# Patient Record
Sex: FEMALE | Race: WHITE | NOT HISPANIC OR LATINO | Employment: FULL TIME | URBAN - METROPOLITAN AREA
[De-identification: names, ages, dates, MRNs, and addresses within clinical notes are randomized per-mention and may not be internally consistent; named-entity substitution may affect disease eponyms.]

---

## 2018-02-27 PROBLEM — M16.12 PRIMARY OSTEOARTHRITIS OF LEFT HIP: Status: ACTIVE | Noted: 2018-02-27

## 2019-08-22 ENCOUNTER — TELEPHONE (OUTPATIENT)
Dept: PAIN MEDICINE | Facility: CLINIC | Age: 74
End: 2019-08-22

## 2019-08-22 NOTE — TELEPHONE ENCOUNTER
Spoke with the patient and she is aware that we did not have anything sooner than her appointment date on 9/10. She is currently on the wait list for a sooner appointment.

## 2019-08-22 NOTE — TELEPHONE ENCOUNTER
----- Message from Ryan Rosario sent at 8/22/2019  3:51 PM CDT -----  Type:  Sooner Apoointment Request    Caller is requesting a sooner appointment.  Caller declined first available appointment listed below.  Caller will not accept being placed on the waitlist and is requesting a message be sent to doctor.    Name of Caller:  Patient  When is the first available appointment?  Patient states that she is scheduled on 09/04 but would like to be seen on 08/27 if possible.  Patient is on the wait list  Symptoms:  Spinal stenosis-New Patient  Best Call Back Number:  [phone]  Additional Information:

## 2019-09-10 ENCOUNTER — OFFICE VISIT (OUTPATIENT)
Dept: PAIN MEDICINE | Facility: CLINIC | Age: 74
End: 2019-09-10
Payer: MEDICARE

## 2019-09-10 VITALS
SYSTOLIC BLOOD PRESSURE: 165 MMHG | DIASTOLIC BLOOD PRESSURE: 73 MMHG | TEMPERATURE: 97 F | WEIGHT: 179.81 LBS | RESPIRATION RATE: 18 BRPM | BODY MASS INDEX: 33.95 KG/M2 | HEIGHT: 61 IN | HEART RATE: 75 BPM | OXYGEN SATURATION: 95 %

## 2019-09-10 DIAGNOSIS — M48.062 SPINAL STENOSIS OF LUMBAR REGION WITH NEUROGENIC CLAUDICATION: ICD-10-CM

## 2019-09-10 DIAGNOSIS — M47.816 LUMBAR SPONDYLOSIS: ICD-10-CM

## 2019-09-10 DIAGNOSIS — M54.16 LUMBAR RADICULOPATHY: Primary | ICD-10-CM

## 2019-09-10 DIAGNOSIS — M51.36 DDD (DEGENERATIVE DISC DISEASE), LUMBAR: ICD-10-CM

## 2019-09-10 PROCEDURE — 1101F PR PT FALLS ASSESS DOC 0-1 FALLS W/OUT INJ PAST YR: ICD-10-PCS | Mod: CPTII,S$GLB,, | Performed by: ANESTHESIOLOGY

## 2019-09-10 PROCEDURE — 99204 PR OFFICE/OUTPT VISIT, NEW, LEVL IV, 45-59 MIN: ICD-10-PCS | Mod: S$GLB,,, | Performed by: ANESTHESIOLOGY

## 2019-09-10 PROCEDURE — 1101F PT FALLS ASSESS-DOCD LE1/YR: CPT | Mod: CPTII,S$GLB,, | Performed by: ANESTHESIOLOGY

## 2019-09-10 PROCEDURE — 99999 PR PBB SHADOW E&M-EST. PATIENT-LVL V: ICD-10-PCS | Mod: PBBFAC,,, | Performed by: ANESTHESIOLOGY

## 2019-09-10 PROCEDURE — 99999 PR PBB SHADOW E&M-EST. PATIENT-LVL V: CPT | Mod: PBBFAC,,, | Performed by: ANESTHESIOLOGY

## 2019-09-10 PROCEDURE — 99204 OFFICE O/P NEW MOD 45 MIN: CPT | Mod: S$GLB,,, | Performed by: ANESTHESIOLOGY

## 2019-09-10 RX ORDER — SODIUM CHLORIDE, SODIUM LACTATE, POTASSIUM CHLORIDE, CALCIUM CHLORIDE 600; 310; 30; 20 MG/100ML; MG/100ML; MG/100ML; MG/100ML
INJECTION, SOLUTION INTRAVENOUS CONTINUOUS
Status: CANCELLED | OUTPATIENT
Start: 2019-09-24

## 2019-09-10 RX ORDER — GABAPENTIN 300 MG/1
300 CAPSULE ORAL NIGHTLY
Qty: 30 CAPSULE | Refills: 1 | Status: SHIPPED | OUTPATIENT
Start: 2019-09-10 | End: 2019-11-01 | Stop reason: SINTOL

## 2019-09-10 NOTE — PROGRESS NOTES
This note was completed with dictation software and grammatical errors may exist.    CC: Back, buttock pain, leg pain    HPI:  The patient is a 74-year-old woman with a history of breast cancer, hypertension, left hip replacement who presents in self-referral for bilateral leg pain. The patient reports having left hip and leg pain over a year ago, was having difficulty walking, eventually underwent left hip replacement which helped the groin and buttock pain.  However, she began developing bilateral low back pain, buttock, posterior thigh pain. This is particularly worse with standing and walking especially when moving from PSI sitting to standing.  She is now having pain even when lying down for a long time as well. The pain radiates into the posterior thighs to about the knees but does not extend any further.  She also reports having a great deal of tailbone pain. She describes the pain as sharp, shooting.  It seems to be slightly improved with laying down, sitting and with rest.  She denies any numbness in her legs but does feel that her legs are heavy the further she walks.    ROS:  She reports weight gain, joint stiffness, back pain, difficulty sleeping and loss of balance.  Balance of review of systems is negative.    Past Medical History:   Diagnosis Date    Arthritis     RIGHT KNEE    Cancer     BREAST    GERD (gastroesophageal reflux disease)     Hypertension     Primary osteoarthritis of left hip 2018    Sciatica        Past Surgical History:   Procedure Laterality Date    ARTHROPLASTY-HIP Left 2018    [facility]     SECTION      JOINT REPLACEMENT Left 2018    MILA    KNEE ARTHROSCOPY      RIGHT KNEE SCOPE    MASTECTOMY Left     TONSILLECTOMY         Social History     Socioeconomic History    Marital status:      Spouse name: Not on file    Number of children: Not on file    Years of education: Not on file    Highest education  "level: Not on file   Occupational History    Not on file   Social Needs    Financial resource strain: Not on file    Food insecurity:     Worry: Not on file     Inability: Not on file    Transportation needs:     Medical: Not on file     Non-medical: Not on file   Tobacco Use    Smoking status: Never Smoker    Smokeless tobacco: Never Used   Substance and Sexual Activity    Alcohol use: Yes     Alcohol/week: 0.0 oz     Comment: social; less than monthly    Drug use: No    Sexual activity: Not on file   Lifestyle    Physical activity:     Days per week: Not on file     Minutes per session: Not on file    Stress: Not on file   Relationships    Social connections:     Talks on phone: Not on file     Gets together: Not on file     Attends Restoration service: Not on file     Active member of club or organization: Not on file     Attends meetings of clubs or organizations: Not on file     Relationship status: Not on file   Other Topics Concern    Not on file   Social History Narrative    Not on file         Medications/Allergies: See med card    Vitals:    09/10/19 1135   BP: (!) 165/73   Pulse: 75   Resp: 18   Temp: 97.4 °F (36.3 °C)   TempSrc: Oral   SpO2: 95%   Weight: 81.5 kg (179 lb 12.6 oz)   Height: 5' 1" (1.549 m)   PainSc:   6   PainLoc: Back         Physical exam:  Gen: A and O x3, pleasant, well-groomed  Skin: No rashes or obvious lesions  HEENT: PERRLA, no obvious deformities on ears or in canals. Trachea midline.  CVS: Regular rate and rhythm, normal palpable pulses.  Resp:No increased work of breathing, symmetrical chest rise.  Abdomen: Soft, NT/ND.  Musculoskeletal:  Walks with a slow, waddling gait, leans forward slightly.    Neuro:  Lower extremities: 5/5 strength bilaterally  Reflexes: Patellar 0+, Achilles 0+ bilaterally.  Sensory:  Intact and symmetrical to light touch and pinprick in L2-S1 dermatomes bilaterally.    Lumbar spine:  Lumbar spine:  Range of motion mildly decreased with " forward flexion with no increased pain, moderately decreased with extension with increased pain in the buttock and ipsilateral buttock pain with oblique extension to either side.    Mariano's test causes no increased pain on either side.    Supine straight leg raise is negative bilaterally.    Internal and external rotation of the hip causes no increased pain on either side.  Myofascial exam: No tenderness to palpation across lumbar paraspinous muscles.    Imaging:  MRI lumbar spine 10/30/2018 report outside institution:  T12/L1 there is a broad-based bulge with facet hypertrophy without foraminal stenosis or foraminal narrowing.  At L1/2 there is moderate facet arthrosis with broad-based disc bulging resulting in mild to moderate canal stenosis and foraminal narrowing.  At L2/3 there is broad-based disc bulge with moderate facet arthrosis resulting in mild to moderate canal stenosis and moderate right lateral recess narrowing bilateral foraminal narrowing.  There is encroachment on the right L3 nerve within the lateral recess.  At L3/4 there is broad-based protrusion with facet hypertrophy and ligamentum flavum hypertrophy resulting in moderate to severe canal stenosis and foraminal narrowing.  At L4/5 there is broad-based disc bulge with moderate facet arthropathy resulting in severe canal stenosis and foraminal narrowing.  At L5/S1 there is broad-based disc bulge and facet hypertrophy resulting in moderate lateral recess narrowing and mild central canal stenosis.    Assessment:  The patient is a 74-year-old woman with a history of breast cancer, hypertension, left hip replacement who presents in self-referral for bilateral leg pain.  1. Lumbar radiculopathy  Vital signs    Verify informed consent    Notify physician     Notify physician     Notify physician (specify)    Diet NPO    Case Request Operating Room: Injection-steroid-epidural-lumbar L5/S1    Place in Outpatient    lactated ringers infusion   2. Spinal  stenosis of lumbar region with neurogenic claudication     3. DDD (degenerative disc disease), lumbar     4. Lumbar spondylosis           Plan:  1. We reviewed her lumbar spine imaging which shows severe stenosis at L3/4 and L4/5 and this seems to be fairly consistent with her symptoms.  We discussed the role of epidural steroid injections and she would like to proceed.  I will set her up for an L5/S1 GURPREET and have her follow up in several weeks after the injection. We discussed if she is having relief this could be repeated as needed, if not having benefit, I would have her see 1 of our neurosurgeons.  She will follow up in several weeks after the injection or sooner as needed.  2.  I have also given her a prescription for gabapentin to start nightly, 300 mg to hopefully improve some of her symptoms throughout the day.

## 2019-09-10 NOTE — H&P (VIEW-ONLY)
This note was completed with dictation software and grammatical errors may exist.    CC: Back, buttock pain, leg pain    HPI:  The patient is a 74-year-old woman with a history of breast cancer, hypertension, left hip replacement who presents in self-referral for bilateral leg pain. The patient reports having left hip and leg pain over a year ago, was having difficulty walking, eventually underwent left hip replacement which helped the groin and buttock pain.  However, she began developing bilateral low back pain, buttock, posterior thigh pain. This is particularly worse with standing and walking especially when moving from PSI sitting to standing.  She is now having pain even when lying down for a long time as well. The pain radiates into the posterior thighs to about the knees but does not extend any further.  She also reports having a great deal of tailbone pain. She describes the pain as sharp, shooting.  It seems to be slightly improved with laying down, sitting and with rest.  She denies any numbness in her legs but does feel that her legs are heavy the further she walks.    ROS:  She reports weight gain, joint stiffness, back pain, difficulty sleeping and loss of balance.  Balance of review of systems is negative.    Past Medical History:   Diagnosis Date    Arthritis     RIGHT KNEE    Cancer     BREAST    GERD (gastroesophageal reflux disease)     Hypertension     Primary osteoarthritis of left hip 2018    Sciatica        Past Surgical History:   Procedure Laterality Date    ARTHROPLASTY-HIP Left 2018    [facility]formed [facility] at [facility] OR     SECTION      JOINT REPLACEMENT Left 2018    MILA    KNEE ARTHROSCOPY      RIGHT KNEE SCOPE    MASTECTOMY Left     TONSILLECTOMY         Social History     Socioeconomic History    Marital status:      Spouse name: Not on file    Number of children: Not on file    Years of education: Not on file    Highest education  "level: Not on file   Occupational History    Not on file   Social Needs    Financial resource strain: Not on file    Food insecurity:     Worry: Not on file     Inability: Not on file    Transportation needs:     Medical: Not on file     Non-medical: Not on file   Tobacco Use    Smoking status: Never Smoker    Smokeless tobacco: Never Used   Substance and Sexual Activity    Alcohol use: Yes     Alcohol/week: 0.0 oz     Comment: social; less than monthly    Drug use: No    Sexual activity: Not on file   Lifestyle    Physical activity:     Days per week: Not on file     Minutes per session: Not on file    Stress: Not on file   Relationships    Social connections:     Talks on phone: Not on file     Gets together: Not on file     Attends Sabianism service: Not on file     Active member of club or organization: Not on file     Attends meetings of clubs or organizations: Not on file     Relationship status: Not on file   Other Topics Concern    Not on file   Social History Narrative    Not on file         Medications/Allergies: See med card    Vitals:    09/10/19 1135   BP: (!) 165/73   Pulse: 75   Resp: 18   Temp: 97.4 °F (36.3 °C)   TempSrc: Oral   SpO2: 95%   Weight: 81.5 kg (179 lb 12.6 oz)   Height: 5' 1" (1.549 m)   PainSc:   6   PainLoc: Back         Physical exam:  Gen: A and O x3, pleasant, well-groomed  Skin: No rashes or obvious lesions  HEENT: PERRLA, no obvious deformities on ears or in canals. Trachea midline.  CVS: Regular rate and rhythm, normal palpable pulses.  Resp:No increased work of breathing, symmetrical chest rise.  Abdomen: Soft, NT/ND.  Musculoskeletal:  Walks with a slow, waddling gait, leans forward slightly.    Neuro:  Lower extremities: 5/5 strength bilaterally  Reflexes: Patellar 0+, Achilles 0+ bilaterally.  Sensory:  Intact and symmetrical to light touch and pinprick in L2-S1 dermatomes bilaterally.    Lumbar spine:  Lumbar spine:  Range of motion mildly decreased with " forward flexion with no increased pain, moderately decreased with extension with increased pain in the buttock and ipsilateral buttock pain with oblique extension to either side.    Mariano's test causes no increased pain on either side.    Supine straight leg raise is negative bilaterally.    Internal and external rotation of the hip causes no increased pain on either side.  Myofascial exam: No tenderness to palpation across lumbar paraspinous muscles.    Imaging:  MRI lumbar spine 10/30/2018 report outside institution:  T12/L1 there is a broad-based bulge with facet hypertrophy without foraminal stenosis or foraminal narrowing.  At L1/2 there is moderate facet arthrosis with broad-based disc bulging resulting in mild to moderate canal stenosis and foraminal narrowing.  At L2/3 there is broad-based disc bulge with moderate facet arthrosis resulting in mild to moderate canal stenosis and moderate right lateral recess narrowing bilateral foraminal narrowing.  There is encroachment on the right L3 nerve within the lateral recess.  At L3/4 there is broad-based protrusion with facet hypertrophy and ligamentum flavum hypertrophy resulting in moderate to severe canal stenosis and foraminal narrowing.  At L4/5 there is broad-based disc bulge with moderate facet arthropathy resulting in severe canal stenosis and foraminal narrowing.  At L5/S1 there is broad-based disc bulge and facet hypertrophy resulting in moderate lateral recess narrowing and mild central canal stenosis.    Assessment:  The patient is a 74-year-old woman with a history of breast cancer, hypertension, left hip replacement who presents in self-referral for bilateral leg pain.  1. Lumbar radiculopathy  Vital signs    Verify informed consent    Notify physician     Notify physician     Notify physician (specify)    Diet NPO    Case Request Operating Room: Injection-steroid-epidural-lumbar L5/S1    Place in Outpatient    lactated ringers infusion   2. Spinal  stenosis of lumbar region with neurogenic claudication     3. DDD (degenerative disc disease), lumbar     4. Lumbar spondylosis           Plan:  1. We reviewed her lumbar spine imaging which shows severe stenosis at L3/4 and L4/5 and this seems to be fairly consistent with her symptoms.  We discussed the role of epidural steroid injections and she would like to proceed.  I will set her up for an L5/S1 GURPREET and have her follow up in several weeks after the injection. We discussed if she is having relief this could be repeated as needed, if not having benefit, I would have her see 1 of our neurosurgeons.  She will follow up in several weeks after the injection or sooner as needed.  2.  I have also given her a prescription for gabapentin to start nightly, 300 mg to hopefully improve some of her symptoms throughout the day.

## 2019-09-23 DIAGNOSIS — M51.36 DDD (DEGENERATIVE DISC DISEASE), LUMBAR: Primary | ICD-10-CM

## 2019-09-24 ENCOUNTER — HOSPITAL ENCOUNTER (OUTPATIENT)
Facility: HOSPITAL | Age: 74
Discharge: HOME | End: 2019-09-24
Attending: ANESTHESIOLOGY | Admitting: ANESTHESIOLOGY
Payer: MEDICARE

## 2019-09-24 ENCOUNTER — HOSPITAL ENCOUNTER (OUTPATIENT)
Dept: RADIOLOGY | Facility: HOSPITAL | Age: 74
Discharge: HOME | End: 2019-09-24
Attending: ANESTHESIOLOGY | Admitting: ANESTHESIOLOGY
Payer: MEDICARE

## 2019-09-24 DIAGNOSIS — M51.36 DDD (DEGENERATIVE DISC DISEASE), LUMBAR: ICD-10-CM

## 2019-09-24 DIAGNOSIS — M54.16 LUMBAR RADICULOPATHY: Primary | ICD-10-CM

## 2019-09-24 PROCEDURE — 62323 NJX INTERLAMINAR LMBR/SAC: CPT | Mod: ,,, | Performed by: ANESTHESIOLOGY

## 2019-09-24 PROCEDURE — 62323 PR INJ LUMBAR/SACRAL, W/IMAGING GUIDANCE: ICD-10-PCS | Mod: ,,, | Performed by: ANESTHESIOLOGY

## 2019-09-24 PROCEDURE — 25500020 PHARM REV CODE 255: Mod: PO | Performed by: ANESTHESIOLOGY

## 2019-09-24 PROCEDURE — A4216 STERILE WATER/SALINE, 10 ML: HCPCS | Mod: PO | Performed by: ANESTHESIOLOGY

## 2019-09-24 PROCEDURE — 76000 FLUOROSCOPY <1 HR PHYS/QHP: CPT | Mod: TC,PO

## 2019-09-24 PROCEDURE — 63600175 PHARM REV CODE 636 W HCPCS: Mod: PO | Performed by: ANESTHESIOLOGY

## 2019-09-24 PROCEDURE — 25000003 PHARM REV CODE 250: Mod: PO | Performed by: ANESTHESIOLOGY

## 2019-09-24 PROCEDURE — 62323 NJX INTERLAMINAR LMBR/SAC: CPT | Mod: PO | Performed by: ANESTHESIOLOGY

## 2019-09-24 RX ORDER — LIDOCAINE HYDROCHLORIDE 10 MG/ML
INJECTION, SOLUTION EPIDURAL; INFILTRATION; INTRACAUDAL; PERINEURAL
Status: DISCONTINUED | OUTPATIENT
Start: 2019-09-24 | End: 2019-09-24 | Stop reason: HOSPADM

## 2019-09-24 RX ORDER — SODIUM CHLORIDE 9 MG/ML
INJECTION, SOLUTION INTRAMUSCULAR; INTRAVENOUS; SUBCUTANEOUS
Status: DISCONTINUED | OUTPATIENT
Start: 2019-09-24 | End: 2019-09-24 | Stop reason: HOSPADM

## 2019-09-24 RX ORDER — SODIUM CHLORIDE, SODIUM LACTATE, POTASSIUM CHLORIDE, CALCIUM CHLORIDE 600; 310; 30; 20 MG/100ML; MG/100ML; MG/100ML; MG/100ML
INJECTION, SOLUTION INTRAVENOUS CONTINUOUS
Status: DISCONTINUED | OUTPATIENT
Start: 2019-09-24 | End: 2019-09-24

## 2019-09-24 RX ORDER — METHYLPREDNISOLONE ACETATE 80 MG/ML
INJECTION, SUSPENSION INTRA-ARTICULAR; INTRALESIONAL; INTRAMUSCULAR; SOFT TISSUE
Status: DISCONTINUED | OUTPATIENT
Start: 2019-09-24 | End: 2019-09-24 | Stop reason: HOSPADM

## 2019-09-24 NOTE — DISCHARGE INSTRUCTIONS
Department of General Surgery  [facility]  PAIN MANAGEMENT    Home care instructions   Apply ice pack to the injection site for 20 minute prior for the first 24 hours for soreness/discomfort at               injection site   DO NOT USE HEAT FOR 24 HOURS              MAY SHOWER TOMORROW   Keep site clean and dry for 24 hours   Do not drive until tomorrow  Take care when walking after YOUR LUMBAR LOWER BACK STEROID INJECTION    STEROIDS    May take 10-14 days for full effects  Avoid strenuous exercises for 2 days        Resume Aspirin, Plavix, or Coumadin the day after the procedure unless other wise instructed  Resume home medication as prescribed today      CALL PHYSICIAN FOR:   Severe increase in your usual pain or appearance of new pain   Prolonged or increasing weakness or numbness in the legs or arms   Fever greater then 100 degrees F..   Drainage from the incision site, redness, active bleeding or increased swelling at the injection site   Headache that increases when your head is upright and decreases when you lie flat    FOR EMERGENCIES:   Go directly to Emergency Department for Shortness of breath, chest pain, or problems breathing

## 2019-09-24 NOTE — OP NOTE

## 2019-09-24 NOTE — DISCHARGE SUMMARY
[facility]  Discharge Note  Short Stay    Admit Date: 9/24/2019    Discharge Date: 9/24/2019    Attending Physician: Alonso Birmingham MD     Discharge Provider: Alonso Birmingham    Diagnoses:  Active Hospital Problems    Diagnosis  POA    *Lumbar radiculopathy [M54.16]  Yes      Resolved Hospital Problems   No resolved problems to display.       Discharged Condition: good    Final Diagnoses: Lumbar radiculopathy [M54.16]    Disposition: Home or Self Care    Hospital Course: no complications, uneventful    Outcome of Hospitalization, Treatment, Procedure, or Surgery:  Patient was admitted for outpatient procedure. The patient underwent procedure without complications and are discharged home    Follow up/Patient Instructions:  Follow up as scheduled in Pain Management clinic in 3-4 weeks/Patient has received instructions and follow up date and time    Medications:  Continue previous medications    Discharge Procedure Orders   Call MD for:  temperature >100.4     Call MD for:  severe uncontrolled pain     Call MD for:  redness, tenderness, or signs of infection (pain, swelling, redness, odor or green/yellow discharge around incision site)     Call MD for:  severe persistent headache     No dressing needed         Discharge Procedure Orders (must include Diet, Follow-up, Activity):   Discharge Procedure Orders (must include Diet, Follow-up, Activity)   Call MD for:  temperature >100.4     Call MD for:  severe uncontrolled pain     Call MD for:  redness, tenderness, or signs of infection (pain, swelling, redness, odor or green/yellow discharge around incision site)     Call MD for:  severe persistent headache     No dressing needed

## 2019-09-25 VITALS
SYSTOLIC BLOOD PRESSURE: 187 MMHG | BODY MASS INDEX: 33.79 KG/M2 | WEIGHT: 179 LBS | HEART RATE: 82 BPM | HEIGHT: 61 IN | OXYGEN SATURATION: 99 % | TEMPERATURE: 98 F | DIASTOLIC BLOOD PRESSURE: 77 MMHG | RESPIRATION RATE: 15 BRPM

## 2019-10-22 ENCOUNTER — OFFICE VISIT (OUTPATIENT)
Dept: PAIN MEDICINE | Facility: CLINIC | Age: 74
End: 2019-10-22
Payer: MEDICARE

## 2019-10-22 VITALS
BODY MASS INDEX: 33.62 KG/M2 | WEIGHT: 177.94 LBS | OXYGEN SATURATION: 100 % | DIASTOLIC BLOOD PRESSURE: 75 MMHG | HEART RATE: 75 BPM | TEMPERATURE: 97 F | RESPIRATION RATE: 18 BRPM | SYSTOLIC BLOOD PRESSURE: 166 MMHG

## 2019-10-22 DIAGNOSIS — M51.36 DDD (DEGENERATIVE DISC DISEASE), LUMBAR: ICD-10-CM

## 2019-10-22 DIAGNOSIS — M47.816 LUMBAR SPONDYLOSIS: ICD-10-CM

## 2019-10-22 DIAGNOSIS — M48.062 SPINAL STENOSIS OF LUMBAR REGION WITH NEUROGENIC CLAUDICATION: Primary | ICD-10-CM

## 2019-10-22 DIAGNOSIS — M47.816 LUMBAR SPONDYLOSIS: Primary | ICD-10-CM

## 2019-10-22 DIAGNOSIS — M54.16 LUMBAR RADICULOPATHY: ICD-10-CM

## 2019-10-22 PROCEDURE — 1101F PT FALLS ASSESS-DOCD LE1/YR: CPT | Mod: CPTII,S$GLB,, | Performed by: ANESTHESIOLOGY

## 2019-10-22 PROCEDURE — 99999 PR PBB SHADOW E&M-EST. PATIENT-LVL IV: ICD-10-PCS | Mod: PBBFAC,,, | Performed by: ANESTHESIOLOGY

## 2019-10-22 PROCEDURE — 1101F PR PT FALLS ASSESS DOC 0-1 FALLS W/OUT INJ PAST YR: ICD-10-PCS | Mod: CPTII,S$GLB,, | Performed by: ANESTHESIOLOGY

## 2019-10-22 PROCEDURE — 99213 PR OFFICE/OUTPT VISIT, EST, LEVL III, 20-29 MIN: ICD-10-PCS | Mod: S$GLB,,, | Performed by: ANESTHESIOLOGY

## 2019-10-22 PROCEDURE — 99999 PR PBB SHADOW E&M-EST. PATIENT-LVL IV: CPT | Mod: PBBFAC,,, | Performed by: ANESTHESIOLOGY

## 2019-10-22 PROCEDURE — 99213 OFFICE O/P EST LOW 20 MIN: CPT | Mod: S$GLB,,, | Performed by: ANESTHESIOLOGY

## 2019-10-22 RX ORDER — SODIUM CHLORIDE, SODIUM LACTATE, POTASSIUM CHLORIDE, CALCIUM CHLORIDE 600; 310; 30; 20 MG/100ML; MG/100ML; MG/100ML; MG/100ML
INJECTION, SOLUTION INTRAVENOUS CONTINUOUS
Status: CANCELLED | OUTPATIENT
Start: 2019-11-05

## 2019-10-22 NOTE — H&P (VIEW-ONLY)
This note was completed with dictation software and grammatical errors may exist.    CC: Back, buttock pain, leg pain    HPI:  The patient is a 74-year-old woman with a history of breast cancer, hypertension, left hip replacement who presents in self-referral for bilateral leg pain. She is status post L5/S1 GURPREET on 09/24/2019 with What she describes as a 75% relief.  She states that she no longer has pain in the left leg but states that the thing the bothers her the most is her back pain. She states that as soon as she stands up in begins walking she has severe low back pain, makes her lean forward when she walks.  She does get some relief with leaning forward.  She states that her balance is affected but it does not sound like she has numbness or weakness in her legs rather she has a constant feeling of needing to lean forward and because of this she always feels that she is going to fall forward.  She denies any bowel or bladder incontinence, denies any tima weakness.  She is continuing to take ibuprofen 800 mg although we had tried diclofenac with misoprostol, she reports no relief with this.        The patient reports having left hip and leg pain over a year ago, was having difficulty walking, eventually underwent left hip replacement which helped the groin and buttock pain.  However, she began developing bilateral low back pain, buttock, posterior thigh pain. This is particularly worse with standing and walking especially when moving from PSI sitting to standing.  She is now having pain even when lying down for a long time as well. The pain radiates into the posterior thighs to about the knees but does not extend any further.  She also reports having a great deal of tailbone pain. She describes the pain as sharp, shooting.  It seems to be slightly improved with laying down, sitting and with rest.  She denies any numbness in her legs but does feel that her legs are heavy the further she walks.    ROS:  She  reports weight gain, joint stiffness, back pain, difficulty sleeping and loss of balance.  Balance of review of systems is negative.    Past Medical History:   Diagnosis Date    Arthritis     RIGHT KNEE    Cancer     BREAST    GERD (gastroesophageal reflux disease)     Hypertension     Primary osteoarthritis of left hip 2018    Sciatica        Past Surgical History:   Procedure Laterality Date     SECTION      EPIDURAL STEROID INJECTION INTO LUMBAR SPINE N/A 2019    Procedure: Injection-steroid-epidural-lumbar L5/S1;  Surgeon: Alonso Birmingham MD;  Location: [facility];  Service: Pain Management;  Laterality: N/A;    JOINT REPLACEMENT Left 2018    MILA    KNEE ARTHROSCOPY      RIGHT KNEE SCOPE    MASTECTOMY Left     TONSILLECTOMY         Social History     Socioeconomic History    Marital status:      Spouse name: Not on file    Number of children: Not on file    Years of education: Not on file    Highest education level: Not on file   Occupational History    Not on file   Social Needs    Financial resource strain: Not on file    Food insecurity:     Worry: Not on file     Inability: Not on file    Transportation needs:     Medical: Not on file     Non-medical: Not on file   Tobacco Use    Smoking status: Never Smoker    Smokeless tobacco: Never Used   Substance and Sexual Activity    Alcohol use: Yes     Alcohol/week: 0.0 standard drinks     Comment: social; less than monthly    Drug use: No    Sexual activity: Not on file   Lifestyle    Physical activity:     Days per week: Not on file     Minutes per session: Not on file    Stress: Not on file   Relationships    Social connections:     Talks on phone: Not on file     Gets together: Not on file     Attends Zoroastrian service: Not on file     Active member of club or organization: Not on file     Attends meetings of clubs or organizations: Not on file     Relationship status: Not on file   Other Topics Concern     Not on file   Social History Narrative    Not on file         Medications/Allergies: See med card    Vitals:    10/22/19 1031   BP: (!) 166/75   Pulse: 75   Resp: 18   Temp: 96.8 °F (36 °C)   TempSrc: Oral   SpO2: 100%   Weight: 80.7 kg (177 lb 14.6 oz)   PainSc:   4   PainLoc: Back         Physical exam:  Gen: A and O x3, pleasant, well-groomed  Skin: No rashes or obvious lesions  HEENT: PERRLA, no obvious deformities on ears or in canals. Trachea midline.  CVS: Regular rate and rhythm, normal palpable pulses.  Resp:No increased work of breathing, symmetrical chest rise.  Abdomen: Soft, NT/ND.  Musculoskeletal:  Walks with a slow, waddling gait, leans forward slightly.    Neuro:  Lower extremities: 5/5 strength bilaterally  Reflexes: Patellar 0+, Achilles 0+ bilaterally.  Sensory:  Intact and symmetrical to light touch and pinprick in L2-S1 dermatomes bilaterally.    Lumbar spine:  Lumbar spine:  Range of motion mildly decreased with forward flexion with no increased pain, moderately decreased with extension with increased pain in the buttock and ipsilateral buttock pain with oblique extension to either side.    Mariano's test causes no increased pain on either side.    Supine straight leg raise is negative bilaterally.    Internal and external rotation of the hip causes no increased pain on either side.  Myofascial exam: No tenderness to palpation across lumbar paraspinous muscles.    Imaging:  MRI lumbar spine 10/30/2018 report outside institution:  T12/L1 there is a broad-based bulge with facet hypertrophy without foraminal stenosis or foraminal narrowing.  At L1/2 there is moderate facet arthrosis with broad-based disc bulging resulting in mild to moderate canal stenosis and foraminal narrowing.  At L2/3 there is broad-based disc bulge with moderate facet arthrosis resulting in mild to moderate canal stenosis and moderate right lateral recess narrowing bilateral foraminal narrowing.  There is encroachment  on the right L3 nerve within the lateral recess.  At L3/4 there is broad-based protrusion with facet hypertrophy and ligamentum flavum hypertrophy resulting in moderate to severe canal stenosis and foraminal narrowing.  At L4/5 there is broad-based disc bulge with moderate facet arthropathy resulting in severe canal stenosis and foraminal narrowing.  At L5/S1 there is broad-based disc bulge and facet hypertrophy resulting in moderate lateral recess narrowing and mild central canal stenosis.    Assessment:  The patient is a 74-year-old woman with a history of breast cancer, hypertension, left hip replacement who presents in self-referral for bilateral leg pain.  1. Spinal stenosis of lumbar region with neurogenic claudication     2. Lumbar radiculopathy     3. DDD (degenerative disc disease), lumbar     4. Lumbar spondylosis           Plan:    1.  We discussed that she has had relief of her radicular symptoms in her left leg but she continues to have severe low back pain. I explained that this could be due to her stenosis but she also has significant facet hypertrophy at multiple levels.  We discussed trying a bilateral L3, 4, 5 medial branch block for diagnostic purposes.  We will contact her the following day, if she does not have relief with this, we discussed that this could be simply due to her lumbar stenosis.  We discussed that if she is not having significant relief, if she has any weakness or balance issues, she may need to consider surgery.  She states that she will not consider any type of surgery.  Yet she asks what else we can do to help her balance and pain if the injections are not helping.  2.  I am going to have her follow up after the injection if she does not have relief and we could consider further epidural steroid injections.

## 2019-10-22 NOTE — PROGRESS NOTES
This note was completed with dictation software and grammatical errors may exist.    CC: Back, buttock pain, leg pain    HPI:  The patient is a 74-year-old woman with a history of breast cancer, hypertension, left hip replacement who presents in self-referral for bilateral leg pain. She is status post L5/S1 GURPREET on 09/24/2019 with What she describes as a 75% relief.  She states that she no longer has pain in the left leg but states that the thing the bothers her the most is her back pain. She states that as soon as she stands up in begins walking she has severe low back pain, makes her lean forward when she walks.  She does get some relief with leaning forward.  She states that her balance is affected but it does not sound like she has numbness or weakness in her legs rather she has a constant feeling of needing to lean forward and because of this she always feels that she is going to fall forward.  She denies any bowel or bladder incontinence, denies any tima weakness.  She is continuing to take ibuprofen 800 mg although we had tried diclofenac with misoprostol, she reports no relief with this.        The patient reports having left hip and leg pain over a year ago, was having difficulty walking, eventually underwent left hip replacement which helped the groin and buttock pain.  However, she began developing bilateral low back pain, buttock, posterior thigh pain. This is particularly worse with standing and walking especially when moving from PSI sitting to standing.  She is now having pain even when lying down for a long time as well. The pain radiates into the posterior thighs to about the knees but does not extend any further.  She also reports having a great deal of tailbone pain. She describes the pain as sharp, shooting.  It seems to be slightly improved with laying down, sitting and with rest.  She denies any numbness in her legs but does feel that her legs are heavy the further she walks.    ROS:  She  reports weight gain, joint stiffness, back pain, difficulty sleeping and loss of balance.  Balance of review of systems is negative.    Past Medical History:   Diagnosis Date    Arthritis     RIGHT KNEE    Cancer     BREAST    GERD (gastroesophageal reflux disease)     Hypertension     Primary osteoarthritis of left hip 2018    Sciatica        Past Surgical History:   Procedure Laterality Date     SECTION      EPIDURAL STEROID INJECTION INTO LUMBAR SPINE N/A 2019    Procedure: Injection-steroid-epidural-lumbar L5/S1;  Surgeon: Alonso Birmingham MD;  Location: [facility];  Service: Pain Management;  Laterality: N/A;    JOINT REPLACEMENT Left 2018    MILA    KNEE ARTHROSCOPY      RIGHT KNEE SCOPE    MASTECTOMY Left     TONSILLECTOMY         Social History     Socioeconomic History    Marital status:      Spouse name: Not on file    Number of children: Not on file    Years of education: Not on file    Highest education level: Not on file   Occupational History    Not on file   Social Needs    Financial resource strain: Not on file    Food insecurity:     Worry: Not on file     Inability: Not on file    Transportation needs:     Medical: Not on file     Non-medical: Not on file   Tobacco Use    Smoking status: Never Smoker    Smokeless tobacco: Never Used   Substance and Sexual Activity    Alcohol use: Yes     Alcohol/week: 0.0 standard drinks     Comment: social; less than monthly    Drug use: No    Sexual activity: Not on file   Lifestyle    Physical activity:     Days per week: Not on file     Minutes per session: Not on file    Stress: Not on file   Relationships    Social connections:     Talks on phone: Not on file     Gets together: Not on file     Attends Tenriism service: Not on file     Active member of club or organization: Not on file     Attends meetings of clubs or organizations: Not on file     Relationship status: Not on file   Other Topics Concern     Not on file   Social History Narrative    Not on file         Medications/Allergies: See med card    Vitals:    10/22/19 1031   BP: (!) 166/75   Pulse: 75   Resp: 18   Temp: 96.8 °F (36 °C)   TempSrc: Oral   SpO2: 100%   Weight: 80.7 kg (177 lb 14.6 oz)   PainSc:   4   PainLoc: Back         Physical exam:  Gen: A and O x3, pleasant, well-groomed  Skin: No rashes or obvious lesions  HEENT: PERRLA, no obvious deformities on ears or in canals. Trachea midline.  CVS: Regular rate and rhythm, normal palpable pulses.  Resp:No increased work of breathing, symmetrical chest rise.  Abdomen: Soft, NT/ND.  Musculoskeletal:  Walks with a slow, waddling gait, leans forward slightly.    Neuro:  Lower extremities: 5/5 strength bilaterally  Reflexes: Patellar 0+, Achilles 0+ bilaterally.  Sensory:  Intact and symmetrical to light touch and pinprick in L2-S1 dermatomes bilaterally.    Lumbar spine:  Lumbar spine:  Range of motion mildly decreased with forward flexion with no increased pain, moderately decreased with extension with increased pain in the buttock and ipsilateral buttock pain with oblique extension to either side.    Mariano's test causes no increased pain on either side.    Supine straight leg raise is negative bilaterally.    Internal and external rotation of the hip causes no increased pain on either side.  Myofascial exam: No tenderness to palpation across lumbar paraspinous muscles.    Imaging:  MRI lumbar spine 10/30/2018 report outside institution:  T12/L1 there is a broad-based bulge with facet hypertrophy without foraminal stenosis or foraminal narrowing.  At L1/2 there is moderate facet arthrosis with broad-based disc bulging resulting in mild to moderate canal stenosis and foraminal narrowing.  At L2/3 there is broad-based disc bulge with moderate facet arthrosis resulting in mild to moderate canal stenosis and moderate right lateral recess narrowing bilateral foraminal narrowing.  There is encroachment  on the right L3 nerve within the lateral recess.  At L3/4 there is broad-based protrusion with facet hypertrophy and ligamentum flavum hypertrophy resulting in moderate to severe canal stenosis and foraminal narrowing.  At L4/5 there is broad-based disc bulge with moderate facet arthropathy resulting in severe canal stenosis and foraminal narrowing.  At L5/S1 there is broad-based disc bulge and facet hypertrophy resulting in moderate lateral recess narrowing and mild central canal stenosis.    Assessment:  The patient is a 74-year-old woman with a history of breast cancer, hypertension, left hip replacement who presents in self-referral for bilateral leg pain.  1. Spinal stenosis of lumbar region with neurogenic claudication     2. Lumbar radiculopathy     3. DDD (degenerative disc disease), lumbar     4. Lumbar spondylosis           Plan:    1.  We discussed that she has had relief of her radicular symptoms in her left leg but she continues to have severe low back pain. I explained that this could be due to her stenosis but she also has significant facet hypertrophy at multiple levels.  We discussed trying a bilateral L3, 4, 5 medial branch block for diagnostic purposes.  We will contact her the following day, if she does not have relief with this, we discussed that this could be simply due to her lumbar stenosis.  We discussed that if she is not having significant relief, if she has any weakness or balance issues, she may need to consider surgery.  She states that she will not consider any type of surgery.  Yet she asks what else we can do to help her balance and pain if the injections are not helping.  2.  I am going to have her follow up after the injection if she does not have relief and we could consider further epidural steroid injections.

## 2019-11-01 ENCOUNTER — TELEPHONE (OUTPATIENT)
Dept: SURGERY | Facility: HOSPITAL | Age: 74
End: 2019-11-01

## 2019-11-04 DIAGNOSIS — M51.36 DDD (DEGENERATIVE DISC DISEASE), LUMBAR: Primary | ICD-10-CM

## 2019-11-05 ENCOUNTER — HOSPITAL ENCOUNTER (OUTPATIENT)
Facility: HOSPITAL | Age: 74
Discharge: HOME | End: 2019-11-05
Attending: ANESTHESIOLOGY | Admitting: ANESTHESIOLOGY
Payer: MEDICARE

## 2019-11-05 ENCOUNTER — HOSPITAL ENCOUNTER (OUTPATIENT)
Dept: RADIOLOGY | Facility: HOSPITAL | Age: 74
Discharge: HOME | End: 2019-11-05
Attending: ANESTHESIOLOGY | Admitting: ANESTHESIOLOGY
Payer: MEDICARE

## 2019-11-05 DIAGNOSIS — M51.36 DDD (DEGENERATIVE DISC DISEASE), LUMBAR: ICD-10-CM

## 2019-11-05 DIAGNOSIS — M47.816 LUMBAR SPONDYLOSIS: Primary | ICD-10-CM

## 2019-11-05 PROCEDURE — 64494 PR INJ DX/THER AGNT PARAVERT FACET JOINT,IMG GUIDE,LUMBAR/SAC, 2ND LEVEL: ICD-10-PCS | Mod: 50,,, | Performed by: ANESTHESIOLOGY

## 2019-11-05 PROCEDURE — 25500020 PHARM REV CODE 255: Mod: PO | Performed by: ANESTHESIOLOGY

## 2019-11-05 PROCEDURE — 64493 INJ PARAVERT F JNT L/S 1 LEV: CPT | Mod: 50,,, | Performed by: ANESTHESIOLOGY

## 2019-11-05 PROCEDURE — 25000003 PHARM REV CODE 250: Mod: PO | Performed by: ANESTHESIOLOGY

## 2019-11-05 PROCEDURE — 64494 INJ PARAVERT F JNT L/S 2 LEV: CPT | Mod: 50,PO | Performed by: ANESTHESIOLOGY

## 2019-11-05 PROCEDURE — 76000 FLUOROSCOPY <1 HR PHYS/QHP: CPT | Mod: TC,PO

## 2019-11-05 PROCEDURE — 64493 PR INJ DX/THER AGNT PARAVERT FACET JOINT,IMG GUIDE,LUMBAR/SAC,1ST LVL: ICD-10-PCS | Mod: 50,,, | Performed by: ANESTHESIOLOGY

## 2019-11-05 PROCEDURE — 64493 INJ PARAVERT F JNT L/S 1 LEV: CPT | Mod: 50,PO | Performed by: ANESTHESIOLOGY

## 2019-11-05 PROCEDURE — 64494 INJ PARAVERT F JNT L/S 2 LEV: CPT | Mod: 50,,, | Performed by: ANESTHESIOLOGY

## 2019-11-05 RX ORDER — SODIUM CHLORIDE, SODIUM LACTATE, POTASSIUM CHLORIDE, CALCIUM CHLORIDE 600; 310; 30; 20 MG/100ML; MG/100ML; MG/100ML; MG/100ML
INJECTION, SOLUTION INTRAVENOUS CONTINUOUS
Status: DISCONTINUED | OUTPATIENT
Start: 2019-11-05 | End: 2019-11-05

## 2019-11-05 RX ORDER — DICLOFENAC SODIUM 75 MG/1
75 TABLET, DELAYED RELEASE ORAL 2 TIMES DAILY PRN
Qty: 40 TABLET | Refills: 1 | Status: SHIPPED | OUTPATIENT
Start: 2019-11-05

## 2019-11-05 RX ORDER — BUPIVACAINE HYDROCHLORIDE 2.5 MG/ML
INJECTION, SOLUTION EPIDURAL; INFILTRATION; INTRACAUDAL
Status: DISCONTINUED | OUTPATIENT
Start: 2019-11-05 | End: 2019-11-05 | Stop reason: HOSPADM

## 2019-11-05 RX ORDER — LIDOCAINE HYDROCHLORIDE 10 MG/ML
INJECTION, SOLUTION EPIDURAL; INFILTRATION; INTRACAUDAL; PERINEURAL
Status: DISCONTINUED | OUTPATIENT
Start: 2019-11-05 | End: 2019-11-05 | Stop reason: HOSPADM

## 2019-11-05 NOTE — DISCHARGE INSTRUCTIONS
PAIN MANAGEMENT    Home care instructions   Apply ice pack to the injection site for 20 minute prior for the first 24 hours for soreness/discomfort at injection site   DO NOT USE HEAT FOR 24 HOURS   Keep site clean and dry for 24 hours, remove bandaid when desired   Do not drive until tomorrow  Take care when walking after a lumbar injection       BLOCKS  Resume regular activities today  Pain office will call in next 2 days      Resume Aspirin, Plavix, or Coumadin the day after the procedure unless other wise instructed  Resume home medication as prescribed today      CALL PHYSICIAN FOR:   Severe increase in your usual pain or appearance of new pain   Prolonged or increasing weakness or numbness in the legs or arms   Fever greater then 100 degrees F..   Drainage from the incision site, redness, active bleeding or increased swelling at the injection site   Headache that increases when your head is upright and decreases when you lie flat    FOR EMERGENCIES:   Go directly to Emergency Department for Shortness of breath, chest pain, or problems breathing

## 2019-11-05 NOTE — DISCHARGE SUMMARY
[facility]  Discharge Note  Short Stay    Admit Date: 11/5/2019    Discharge Date: 11/5/2019    Attending Physician: Alonso Birmingham MD     Discharge Provider: Alonso Birmingham    Diagnoses:  Active Hospital Problems    Diagnosis  POA    *Lumbar spondylosis [M47.816]  Yes      Resolved Hospital Problems   No resolved problems to display.       Discharged Condition: good    Final Diagnoses: Lumbar spondylosis [M47.816]    Disposition: Home or Self Care    Hospital Course: no complications, uneventful    Outcome of Hospitalization, Treatment, Procedure, or Surgery:  Patient was admitted for outpatient procedure. The patient underwent procedure without complications and are discharged home    Follow up/Patient Instructions:  Follow up as scheduled in Pain Management clinic in 3-4 weeks/Patient has received instructions and follow up date and time    Medications:  Continue previous medications    Discharge Procedure Orders   Call MD for:  temperature >100.4     Call MD for:  severe uncontrolled pain     Call MD for:  redness, tenderness, or signs of infection (pain, swelling, redness, odor or green/yellow discharge around incision site)     Call MD for:  severe persistent headache     No dressing needed         Discharge Procedure Orders (must include Diet, Follow-up, Activity):   Discharge Procedure Orders (must include Diet, Follow-up, Activity)   Call MD for:  temperature >100.4     Call MD for:  severe uncontrolled pain     Call MD for:  redness, tenderness, or signs of infection (pain, swelling, redness, odor or green/yellow discharge around incision site)     Call MD for:  severe persistent headache     No dressing needed

## 2019-11-05 NOTE — OP NOTE
PROCEDURE DATE: 11/5/2019    PROCEDURE:  Bilateral L3,4,5 medial branch nerve block     DIAGNOSIS:  Lumbar spondylosis    Post Op diagnosis: Same    PHYSICIAN: Alonso Birmingham MD    MEDICATIONS INJECTED: 0.25% bupivicaine, 1ml at each level    LOCAL ANESTHETIC USED: Lidocaine 1%, 2ml at each level    SEDATION MEDICATIONS: None    ESTIMATED BLOOD LOSS:  none    COMPLICATIONS:  none    TECHNIQUE: A time out was taken to identify the patient, procedure and side of the procedure. The patient was placed in a prone position, then prepped and draped in the usual sterile fashion using ChloraPrep and sterile towels.  The levels were determined under fluoroscopic guidance and then marked.  Local anesthetic was given by raising a wheal at the skin over each site and then infiltrated approximately 2cm deeper.  A 25-gauge 3.5 inch needle was introduced to the anatomic location of the right and then left L3,4,5 medial branch nerves on the bilateral side.  Appropriate location and medication spread confirmed by injecting 0.5ml of Omnipaque. The above medication was then injected. The patient tolerated the procedure well.     The patient was monitored after the procedure. The patient will be contacted in the next few days to determine extent of relief.  Patient was given post procedure and discharge instructions to follow at home.  The patient was discharged in a stable condition.

## 2019-11-06 VITALS
SYSTOLIC BLOOD PRESSURE: 169 MMHG | OXYGEN SATURATION: 98 % | BODY MASS INDEX: 31.91 KG/M2 | TEMPERATURE: 97 F | RESPIRATION RATE: 16 BRPM | DIASTOLIC BLOOD PRESSURE: 80 MMHG | WEIGHT: 169 LBS | HEART RATE: 73 BPM | HEIGHT: 61 IN

## 2019-11-08 ENCOUNTER — TELEPHONE (OUTPATIENT)
Dept: PAIN MEDICINE | Facility: CLINIC | Age: 74
End: 2019-11-08

## 2019-11-08 NOTE — TELEPHONE ENCOUNTER
Spoke with patient. She stated after the first lumbar injection her back and leg pain went away for 3 weeks and then her back pain came back. She stated the leg pain was still gone. She stated the lumbar block gave her 90% relief and this last several hours. During this time she swept and vacuumed. She would like to try another lumbar injection before doing the ablation. Please advise on this. Thanks.

## 2019-11-10 NOTE — TELEPHONE ENCOUNTER
If the leg pain is still gone and the back pain is the only thing left, the radiofrequency ablation is the next step and probably will provide her excellent relief.

## 2019-11-12 DIAGNOSIS — M47.816 LUMBAR SPONDYLOSIS: Primary | ICD-10-CM

## 2019-11-12 RX ORDER — SODIUM CHLORIDE, SODIUM LACTATE, POTASSIUM CHLORIDE, CALCIUM CHLORIDE 600; 310; 30; 20 MG/100ML; MG/100ML; MG/100ML; MG/100ML
INJECTION, SOLUTION INTRAVENOUS CONTINUOUS
Status: CANCELLED | OUTPATIENT
Start: 2019-11-26

## 2019-11-12 NOTE — TELEPHONE ENCOUNTER
Spoke with patient and the lumbar ablation has been scheduled for 11/26 with a 4 week follow up. Pre-op instructions were reviewed and sent to patient.

## 2019-11-26 ENCOUNTER — HOSPITAL ENCOUNTER (OUTPATIENT)
Facility: HOSPITAL | Age: 74
Discharge: HOME | End: 2019-11-26
Attending: ANESTHESIOLOGY | Admitting: ANESTHESIOLOGY
Payer: MEDICARE

## 2019-11-26 ENCOUNTER — HOSPITAL ENCOUNTER (OUTPATIENT)
Dept: RADIOLOGY | Facility: HOSPITAL | Age: 74
Discharge: HOME | End: 2019-11-26
Attending: ANESTHESIOLOGY | Admitting: ANESTHESIOLOGY
Payer: MEDICARE

## 2019-11-26 DIAGNOSIS — M47.816 LUMBAR SPONDYLOSIS: ICD-10-CM

## 2019-11-26 DIAGNOSIS — M51.36 DDD (DEGENERATIVE DISC DISEASE), LUMBAR: ICD-10-CM

## 2019-11-26 PROCEDURE — 64635 DESTROY LUMB/SAC FACET JNT: CPT | Mod: 50,PO | Performed by: ANESTHESIOLOGY

## 2019-11-26 PROCEDURE — 99152 MOD SED SAME PHYS/QHP 5/>YRS: CPT | Mod: ,,, | Performed by: ANESTHESIOLOGY

## 2019-11-26 PROCEDURE — 63600175 PHARM REV CODE 636 W HCPCS: Mod: PO | Performed by: ANESTHESIOLOGY

## 2019-11-26 PROCEDURE — 25000003 PHARM REV CODE 250: Mod: PO | Performed by: ANESTHESIOLOGY

## 2019-11-26 PROCEDURE — 76000 FLUOROSCOPY <1 HR PHYS/QHP: CPT | Mod: TC,PO

## 2019-11-26 PROCEDURE — 64636 DESTROY L/S FACET JNT ADDL: CPT | Mod: 50,,, | Performed by: ANESTHESIOLOGY

## 2019-11-26 PROCEDURE — 99152 PR MOD CONSCIOUS SEDATION, SAME PHYS, 5+ YRS, FIRST 15 MIN: ICD-10-PCS | Mod: ,,, | Performed by: ANESTHESIOLOGY

## 2019-11-26 PROCEDURE — 64635 PR DESTROY LUMB/SAC FACET JNT: ICD-10-PCS | Mod: 50,,, | Performed by: ANESTHESIOLOGY

## 2019-11-26 PROCEDURE — 64636 PR DESTROY L/S FACET JNT ADDL: ICD-10-PCS | Mod: 50,,, | Performed by: ANESTHESIOLOGY

## 2019-11-26 PROCEDURE — 64636 DESTROY L/S FACET JNT ADDL: CPT | Mod: 50,PO | Performed by: ANESTHESIOLOGY

## 2019-11-26 PROCEDURE — A4216 STERILE WATER/SALINE, 10 ML: HCPCS | Mod: PO | Performed by: ANESTHESIOLOGY

## 2019-11-26 PROCEDURE — 64635 DESTROY LUMB/SAC FACET JNT: CPT | Mod: 50,,, | Performed by: ANESTHESIOLOGY

## 2019-11-26 RX ORDER — METHYLPREDNISOLONE ACETATE 40 MG/ML
INJECTION, SUSPENSION INTRA-ARTICULAR; INTRALESIONAL; INTRAMUSCULAR; SOFT TISSUE
Status: DISCONTINUED | OUTPATIENT
Start: 2019-11-26 | End: 2019-11-26 | Stop reason: HOSPADM

## 2019-11-26 RX ORDER — FENTANYL CITRATE 50 UG/ML
INJECTION, SOLUTION INTRAMUSCULAR; INTRAVENOUS
Status: DISCONTINUED | OUTPATIENT
Start: 2019-11-26 | End: 2019-11-26 | Stop reason: HOSPADM

## 2019-11-26 RX ORDER — LIDOCAINE HYDROCHLORIDE 10 MG/ML
INJECTION, SOLUTION EPIDURAL; INFILTRATION; INTRACAUDAL; PERINEURAL
Status: DISCONTINUED | OUTPATIENT
Start: 2019-11-26 | End: 2019-11-26 | Stop reason: HOSPADM

## 2019-11-26 RX ORDER — SODIUM CHLORIDE 9 MG/ML
INJECTION, SOLUTION INTRAMUSCULAR; INTRAVENOUS; SUBCUTANEOUS
Status: DISCONTINUED | OUTPATIENT
Start: 2019-11-26 | End: 2019-11-26 | Stop reason: HOSPADM

## 2019-11-26 RX ORDER — LIDOCAINE HYDROCHLORIDE 20 MG/ML
INJECTION, SOLUTION EPIDURAL; INFILTRATION; INTRACAUDAL; PERINEURAL
Status: DISCONTINUED | OUTPATIENT
Start: 2019-11-26 | End: 2019-11-26 | Stop reason: HOSPADM

## 2019-11-26 RX ORDER — TURMERIC 400 MG
CAPSULE ORAL
COMMUNITY

## 2019-11-26 RX ORDER — SODIUM CHLORIDE, SODIUM LACTATE, POTASSIUM CHLORIDE, CALCIUM CHLORIDE 600; 310; 30; 20 MG/100ML; MG/100ML; MG/100ML; MG/100ML
INJECTION, SOLUTION INTRAVENOUS CONTINUOUS
Status: DISCONTINUED | OUTPATIENT
Start: 2019-11-26 | End: 2019-11-26 | Stop reason: HOSPADM

## 2019-11-26 RX ORDER — MIDAZOLAM HYDROCHLORIDE 2 MG/2ML
INJECTION, SOLUTION INTRAMUSCULAR; INTRAVENOUS
Status: DISCONTINUED | OUTPATIENT
Start: 2019-11-26 | End: 2019-11-26 | Stop reason: HOSPADM

## 2019-11-26 RX ADMIN — SODIUM CHLORIDE, SODIUM LACTATE, POTASSIUM CHLORIDE, AND CALCIUM CHLORIDE: .6; .31; .03; .02 INJECTION, SOLUTION INTRAVENOUS at 09:11

## 2019-11-26 NOTE — DISCHARGE INSTRUCTIONS
PAIN MANAGEMENT    Home care instructions   Apply ice pack to the injection site for 20 minute prior for the first 24 hours for soreness/discomfort at injection site   DO NOT USE HEAT FOR 24 HOURS   Keep site clean and dry for 24 hours, remove bandaid when desired   Do not drive until tomorrow  Take care when walking after a lumbar injection     STEROIDS OR RADIOFREQUENCY    May take 10-14 days for full effects  Avoid strenuous exercises for 2 days      Resume Aspirin, Plavix, or Coumadin the day after the procedure unless other wise instructed  Resume home medication as prescribed today      CALL PHYSICIAN FOR:   Severe increase in your usual pain or appearance of new pain   Prolonged or increasing weakness or numbness in the legs or arms   Fever greater then 100 degrees F..   Drainage from the incision site, redness, active bleeding or increased swelling at the injection site   Headache that increases when your head is upright and decreases when you lie flat    FOR EMERGENCIES:   Go directly to Emergency Department for Shortness of breath, chest pain, or problems breathing

## 2019-11-26 NOTE — OP NOTE
PROCEDURE DATE: 11/26/2019    PROCEDURE:  Radiofrequency ablation of the bilateral L3,4,5 medial branch nerves on the bilateral-side utilizing fluoroscopy    DIAGNOSIS:  Lumbar spondylosis    Post op Diagnosis: Same    PHYSICIAN: Alonso Birmingham MD    MEDICATIONS INJECTED:  From a mixture of 4ml of 2% lidocaine and 40mg of methylprednisone, 1ml of this solution was injected at each level.    LOCAL ANESTHETIC USED: Lidocaine 1%, 4 ml given at each site.    SEDATION MEDICATIONS: 2mg versed, 50mcg fentanyl    ESTIMATED BLOOD LOSS:  none    COMPLICATIONS:  none    TECHNIQUE:  A time out was taken to identify patient and procedure side prior to starting the procedure. Laying in a prone position, the patient was prepped and draped in the usual sterile fashion using ChloraPrep and sterile towels.  The levels were determined under fluoroscopic guidance and then marked.  Local anesthetic was given by raising a wheal at the skin over each site and then infiltrated approximately 2cm deeper.  A 20-gauge  100 mm discoapi RF needle was introduced to the anatomic location of the right and then left L3,4,5 medial branch nerves.  Motor stimulation up to 2 Volts at each level confirmed no motor nerve involvement.  Impedance was less than 800 ohms at each level. The above noted medication was then injected slowly.  Ablation was performed per level utilizing [place] radiofrequency generator 80°C for 90 seconds. The patient tolerated the procedure well.     The patient was monitored after the procedure.  Patient was given post procedure and discharge instructions to follow at home.  The patient was discharged in a stable condition

## 2019-11-26 NOTE — PLAN OF CARE
VSS, all questions answered. Denies recent fever or illness. Pt states ready for procedure. Pt made aware of risks of wearing jewelry during procedure, verbalizes understanding.

## 2019-11-26 NOTE — DISCHARGE SUMMARY
[facility]  Discharge Note  Short Stay    Admit Date: 11/26/2019    Discharge Date: 11/26/2019    Attending Physician: Alonso Birmingham MD     Discharge Provider: Alonso Birmingham    Diagnoses:  Active Hospital Problems    Diagnosis  POA    *Lumbar spondylosis [M47.816]  Yes      Resolved Hospital Problems   No resolved problems to display.       Discharged Condition: good    Final Diagnoses: Lumbar spondylosis [M47.816]    Disposition: Home or Self Care    Hospital Course: no complications, uneventful    Outcome of Hospitalization, Treatment, Procedure, or Surgery:  Patient was admitted for outpatient procedure. The patient underwent procedure without complications and are discharged home    Follow up/Patient Instructions:  Follow up as scheduled in Pain Management clinic in 3-4 weeks/Patient has received instructions and follow up date and time    Medications:  Continue previous medications    Discharge Procedure Orders   Call MD for:  temperature >100.4     Call MD for:  severe uncontrolled pain     Call MD for:  redness, tenderness, or signs of infection (pain, swelling, redness, odor or green/yellow discharge around incision site)     Call MD for:  severe persistent headache     No dressing needed         Discharge Procedure Orders (must include Diet, Follow-up, Activity):   Discharge Procedure Orders (must include Diet, Follow-up, Activity)   Call MD for:  temperature >100.4     Call MD for:  severe uncontrolled pain     Call MD for:  redness, tenderness, or signs of infection (pain, swelling, redness, odor or green/yellow discharge around incision site)     Call MD for:  severe persistent headache     No dressing needed

## 2019-11-26 NOTE — H&P
CC: Back pain    HPI: The patient is a 73yo woman with a history of lumbar spondylosis here for a bilateral L3,4,5 RFA. There are no major changes in history and physical from 10/22/19.    Past Medical History:   Diagnosis Date    Arthritis     RIGHT KNEE    Cancer     BREAST    GERD (gastroesophageal reflux disease)     Hypertension     Primary osteoarthritis of left hip 2018    Sciatica        Past Surgical History:   Procedure Laterality Date     SECTION      EPIDURAL STEROID INJECTION INTO LUMBAR SPINE N/A 2019    Procedure: Injection-steroid-epidural-lumbar L5/S1;  Surgeon: Alonso Birmingham MD;  Location: [facility] OR;  Service: Pain Management;  Laterality: N/A;    INJECTION OF ANESTHETIC AGENT AROUND MEDIAL BRANCH NERVES INNERVATING LUMBAR FACET JOINT Bilateral 2019    Procedure: Block-nerve-medial branch-lumbar L3,4,5;  Surgeon: Alonso Birmingham MD;  Location: [facility] OR;  Service: Pain Management;  Laterality: Bilateral;    JOINT REPLACEMENT Left 2018    MILA    KNEE ARTHROSCOPY      RIGHT KNEE SCOPE    MASTECTOMY Left     TONSILLECTOMY         Family History   Problem Relation Age of Onset    Heart disease Mother     Cancer Mother         chest    Alzheimer's disease Father        Social History     Socioeconomic History    Marital status:      Spouse name: Not on file    Number of children: Not on file    Years of education: Not on file    Highest education level: Not on file   Occupational History    Not on file   Social Needs    Financial resource strain: Not on file    Food insecurity:     Worry: Not on file     Inability: Not on file    Transportation needs:     Medical: Not on file     Non-medical: Not on file   Tobacco Use    Smoking status: Never Smoker    Smokeless tobacco: Never Used   Substance and Sexual Activity    Alcohol use: Yes     Alcohol/week: 0.0 standard drinks     Comment: social; less than monthly    Drug use: No    Sexual  "activity: Not on file   Lifestyle    Physical activity:     Days per week: Not on file     Minutes per session: Not on file    Stress: Not on file   Relationships    Social connections:     Talks on phone: Not on file     Gets together: Not on file     Attends Gnosticism service: Not on file     Active member of club or organization: Not on file     Attends meetings of clubs or organizations: Not on file     Relationship status: Not on file   Other Topics Concern    Not on file   Social History Narrative    Not on file       Current Facility-Administered Medications   Medication Dose Route Frequency Provider Last Rate Last Dose    lactated ringers infusion   Intravenous Continuous Alonso Birmingham MD           Review of patient's allergies indicates:   Allergen Reactions    Adhesive Blisters     Cloth tape causes blistering    Adhesive tape-silicones Other (See Comments)     Cloth tape causes blistering    Codeine Nausea And Vomiting    Sulfa (sulfonamide antibiotics) Nausea Only    Sulfasalazine Nausea Only       Vitals:    11/26/19 0826   BP: 133/65   Pulse: 66   Resp: 16   Temp: 97 °F (36.1 °C)   TempSrc: Skin   SpO2: 100%   Weight: 68.9 kg (152 lb)   Height: 5' 1" (1.549 m)       REVIEW OF SYSTEMS:     GENERAL: No weight loss, malaise or fevers.  HEENT:  No recent changes in vision or hearing  NECK: Negative for lumps, no difficulty with swallowing.  RESPIRATORY: Negative for cough, wheezing or shortness of breath, patient denies any recent URI.  CARDIOVASCULAR: Negative for chest pain, leg swelling or palpitations.  GI: Negative for abdominal discomfort, blood in stools or black stools or change in bowel habits.  MUSCULOSKELETAL: See HPI.  SKIN: Negative for lesions, rash, and itching.  PSYCH: No suicidal or homicidal ideations, no current mood disturbances.  HEMATOLOGY/LYMPHOLOGY: Negative for prolonged bleeding, bruising easily or swollen nodes. Patient is not currently taking any " anti-coagulants  ENDO: No history of diabetes or thyroid dysfunction  NEURO: No history of syncope, paralysis, seizures or tremors.All other reviewed and negative other than HPI.    Physical exam:  Gen: A and O x3, pleasant, well-groomed  Skin: No rashes or obvious lesions  HEENT: PERRLA, no obvious deformities on ears or in canals. No thyroid masses, trachea midline, no palpable lymph nodes in neck, axilla.  CVS: Regular rate and rhythm, normal S1 and S2, no murmurs.  Resp: Clear to auscultation bilaterally.  Abdomen: Soft, NT/ND, normal bowel sounds present.  Musculoskeletal/Neuro: Moving all extremities    Assessment:  Lumbar spondylosis  -     Case Request Operating Room: Radiofrequency Ablation, Nerve, Spinal, Lumbar, Medial Branch L3,4,5  -     Place in Outpatient; Standing  -     Diet NPO; Standing  -     lactated ringers infusion  -     Notify physician ; Standing  -     Notify physician ; Standing  -     Notify physician (specify); Standing  -     Place 18-22 gauage peripheral IV ; Standing  -     Verify informed consent; Standing  -     Vital signs; Standing    Other orders  -     IP VTE HIGH RISK PATIENT; Standing

## 2019-11-27 VITALS
TEMPERATURE: 98 F | WEIGHT: 152 LBS | RESPIRATION RATE: 16 BRPM | DIASTOLIC BLOOD PRESSURE: 79 MMHG | OXYGEN SATURATION: 100 % | HEIGHT: 61 IN | HEART RATE: 68 BPM | SYSTOLIC BLOOD PRESSURE: 119 MMHG | BODY MASS INDEX: 28.7 KG/M2

## 2020-03-01 RX ORDER — GABAPENTIN 300 MG/1
CAPSULE ORAL
Qty: 30 CAPSULE | Refills: 2 | Status: SHIPPED | OUTPATIENT
Start: 2020-03-01 | End: 2022-06-28

## 2020-10-01 RX ORDER — DICLOFENAC SODIUM 75 MG/1
TABLET, DELAYED RELEASE ORAL
Qty: 40 TABLET | Refills: 1 | OUTPATIENT
Start: 2020-10-01

## 2020-10-01 NOTE — TELEPHONE ENCOUNTER
Patient has requested a refill on Voltaren oral, I want to make sure that she has had a recent metabolic panel to show that her renal function is okay to continue this, please ask if she has had this done with her primary care physician

## 2020-11-10 DIAGNOSIS — Z12.31 ENCOUNTER FOR SCREENING MAMMOGRAM FOR MALIGNANT NEOPLASM OF BREAST: Primary | ICD-10-CM

## 2020-12-15 ENCOUNTER — HOSPITAL ENCOUNTER (OUTPATIENT)
Dept: RADIOLOGY | Facility: HOSPITAL | Age: 75
Discharge: HOME | End: 2020-12-15
Attending: OBSTETRICS & GYNECOLOGY
Payer: MEDICARE

## 2020-12-15 DIAGNOSIS — Z12.31 ENCOUNTER FOR SCREENING MAMMOGRAM FOR MALIGNANT NEOPLASM OF BREAST: ICD-10-CM

## 2020-12-15 PROCEDURE — 77067 SCR MAMMO BI INCL CAD: CPT | Mod: TC,PO

## 2021-06-01 ENCOUNTER — OFFICE VISIT (OUTPATIENT)
Dept: URGENT CARE | Facility: CLINIC | Age: 76
End: 2021-06-01
Payer: MEDICARE

## 2021-06-01 VITALS
RESPIRATION RATE: 16 BRPM | OXYGEN SATURATION: 96 % | SYSTOLIC BLOOD PRESSURE: 169 MMHG | BODY MASS INDEX: 28.7 KG/M2 | DIASTOLIC BLOOD PRESSURE: 78 MMHG | HEIGHT: 61 IN | HEART RATE: 75 BPM | WEIGHT: 152 LBS | TEMPERATURE: 98 F

## 2021-06-01 DIAGNOSIS — R10.12 LUQ PAIN: Primary | ICD-10-CM

## 2021-06-01 DIAGNOSIS — M41.9 SCOLIOSIS, UNSPECIFIED SCOLIOSIS TYPE, UNSPECIFIED SPINAL REGION: ICD-10-CM

## 2021-06-01 DIAGNOSIS — M25.60 MORNING JOINT STIFFNESS: ICD-10-CM

## 2021-06-01 LAB
BILIRUB UR QL STRIP: NEGATIVE
GLUCOSE UR QL STRIP: NEGATIVE
H PYLORI INDEX VALUE: NEGATIVE
KETONES UR QL STRIP: NEGATIVE
LEUKOCYTE ESTERASE UR QL STRIP: POSITIVE
PH, POC UA: 7.5
POC BLOOD, URINE: NEGATIVE
POC NITRATES, URINE: NEGATIVE
PROT UR QL STRIP: NEGATIVE
SP GR UR STRIP: 1.01 (ref 1–1.03)
UROBILINOGEN UR STRIP-ACNC: NORMAL (ref 0.1–1.1)

## 2021-06-01 PROCEDURE — 99204 OFFICE O/P NEW MOD 45 MIN: CPT | Mod: 25,S$GLB,, | Performed by: NURSE PRACTITIONER

## 2021-06-01 PROCEDURE — 3008F PR BODY MASS INDEX (BMI) DOCUMENTED: ICD-10-PCS | Mod: CPTII,S$GLB,, | Performed by: NURSE PRACTITIONER

## 2021-06-01 PROCEDURE — 96372 THER/PROPH/DIAG INJ SC/IM: CPT | Mod: S$GLB,,, | Performed by: NURSE PRACTITIONER

## 2021-06-01 PROCEDURE — 81003 URINALYSIS AUTO W/O SCOPE: CPT | Mod: QW,S$GLB,, | Performed by: NURSE PRACTITIONER

## 2021-06-01 PROCEDURE — 3008F BODY MASS INDEX DOCD: CPT | Mod: CPTII,S$GLB,, | Performed by: NURSE PRACTITIONER

## 2021-06-01 PROCEDURE — 96372 PR INJECTION,THERAP/PROPH/DIAG2ST, IM OR SUBCUT: ICD-10-PCS | Mod: S$GLB,,, | Performed by: NURSE PRACTITIONER

## 2021-06-01 PROCEDURE — 99204 PR OFFICE/OUTPT VISIT, NEW, LEVL IV, 45-59 MIN: ICD-10-PCS | Mod: 25,S$GLB,, | Performed by: NURSE PRACTITIONER

## 2021-06-01 PROCEDURE — 81003 POCT URINALYSIS, DIPSTICK, AUTOMATED, W/O SCOPE: ICD-10-PCS | Mod: QW,S$GLB,, | Performed by: NURSE PRACTITIONER

## 2021-06-01 RX ORDER — DEXAMETHASONE SODIUM PHOSPHATE 4 MG/ML
4 INJECTION, SOLUTION INTRA-ARTICULAR; INTRALESIONAL; INTRAMUSCULAR; INTRAVENOUS; SOFT TISSUE
Status: COMPLETED | OUTPATIENT
Start: 2021-06-01 | End: 2021-06-01

## 2021-06-01 RX ADMIN — DEXAMETHASONE SODIUM PHOSPHATE 4 MG: 4 INJECTION, SOLUTION INTRA-ARTICULAR; INTRALESIONAL; INTRAMUSCULAR; INTRAVENOUS; SOFT TISSUE at 01:06

## 2022-07-19 ENCOUNTER — HOSPITAL ENCOUNTER (OUTPATIENT)
Dept: RADIOLOGY | Facility: HOSPITAL | Age: 77
Discharge: HOME | End: 2022-07-19
Attending: ORTHOPAEDIC SURGERY
Payer: MEDICARE

## 2022-07-19 DIAGNOSIS — M87.011 IDIOPATHIC ASEPTIC NECROSIS OF RIGHT SHOULDER: ICD-10-CM

## 2022-07-19 PROCEDURE — 73221 MRI JOINT UPR EXTREM W/O DYE: CPT | Mod: TC,PO,RT

## 2024-07-02 ENCOUNTER — OFFICE VISIT (OUTPATIENT)
Dept: UROLOGY | Facility: CLINIC | Age: 79
End: 2024-07-02
Payer: MEDICARE

## 2024-07-02 ENCOUNTER — LAB VISIT (OUTPATIENT)
Dept: LAB | Facility: HOSPITAL | Age: 79
End: 2024-07-02
Attending: UROLOGY
Payer: MEDICARE

## 2024-07-02 ENCOUNTER — PATIENT MESSAGE (OUTPATIENT)
Dept: GASTROENTEROLOGY | Facility: CLINIC | Age: 79
End: 2024-07-02
Payer: MEDICARE

## 2024-07-02 ENCOUNTER — HOSPITAL ENCOUNTER (OUTPATIENT)
Dept: RADIOLOGY | Facility: HOSPITAL | Age: 79
Discharge: HOME | End: 2024-07-02
Attending: UROLOGY
Payer: MEDICARE

## 2024-07-02 ENCOUNTER — TELEPHONE (OUTPATIENT)
Dept: UROLOGY | Facility: CLINIC | Age: 79
End: 2024-07-02

## 2024-07-02 VITALS
WEIGHT: 151.88 LBS | HEART RATE: 109 BPM | HEIGHT: 61 IN | BODY MASS INDEX: 28.67 KG/M2 | DIASTOLIC BLOOD PRESSURE: 72 MMHG | SYSTOLIC BLOOD PRESSURE: 141 MMHG

## 2024-07-02 DIAGNOSIS — N39.0 URINARY TRACT INFECTION WITH HEMATURIA, SITE UNSPECIFIED: Primary | ICD-10-CM

## 2024-07-02 DIAGNOSIS — R31.29 MICROHEMATURIA: ICD-10-CM

## 2024-07-02 DIAGNOSIS — R31.9 URINARY TRACT INFECTION WITH HEMATURIA, SITE UNSPECIFIED: ICD-10-CM

## 2024-07-02 DIAGNOSIS — N39.0 URINARY TRACT INFECTION WITH HEMATURIA, SITE UNSPECIFIED: ICD-10-CM

## 2024-07-02 DIAGNOSIS — R31.9 URINARY TRACT INFECTION WITH HEMATURIA, SITE UNSPECIFIED: Primary | ICD-10-CM

## 2024-07-02 LAB
ALBUMIN SERPL BCP-MCNC: 3.5 G/DL (ref 3.5–5.2)
ALP SERPL-CCNC: 129 U/L (ref 55–135)
ALT SERPL W/O P-5'-P-CCNC: 17 U/L (ref 10–44)
ANION GAP SERPL CALC-SCNC: 12 MMOL/L (ref 8–16)
AST SERPL-CCNC: 19 U/L (ref 10–40)
BACTERIA #/AREA URNS AUTO: ABNORMAL /HPF
BASOPHILS # BLD AUTO: 0.04 K/UL (ref 0–0.2)
BASOPHILS NFR BLD: 0.4 % (ref 0–1.9)
BILIRUB SERPL-MCNC: 0.8 MG/DL (ref 0.1–1)
BILIRUBIN, UA POC OHS: NEGATIVE
BLOOD, UA POC OHS: ABNORMAL
BUN SERPL-MCNC: 43 MG/DL (ref 8–23)
CALCIUM SERPL-MCNC: 9.5 MG/DL (ref 8.7–10.5)
CHLORIDE SERPL-SCNC: 110 MMOL/L (ref 95–110)
CLARITY, UA POC OHS: ABNORMAL
CO2 SERPL-SCNC: 19 MMOL/L (ref 23–29)
COLOR, UA POC OHS: YELLOW
CREAT SERPL-MCNC: 2.1 MG/DL (ref 0.5–1.4)
CREAT SERPL-MCNC: 2.1 MG/DL (ref 0.5–1.4)
DIFFERENTIAL METHOD BLD: ABNORMAL
EOSINOPHIL # BLD AUTO: 0.3 K/UL (ref 0–0.5)
EOSINOPHIL NFR BLD: 3 % (ref 0–8)
ERYTHROCYTE [DISTWIDTH] IN BLOOD BY AUTOMATED COUNT: 14.3 % (ref 11.5–14.5)
EST. GFR  (NO RACE VARIABLE): 24 ML/MIN/1.73 M^2
EST. GFR  (NO RACE VARIABLE): 24 ML/MIN/1.73 M^2
GLUCOSE SERPL-MCNC: 92 MG/DL (ref 70–110)
GLUCOSE, UA POC OHS: NEGATIVE
HCT VFR BLD AUTO: 31.9 % (ref 37–48.5)
HGB BLD-MCNC: 9.8 G/DL (ref 12–16)
IMM GRANULOCYTES # BLD AUTO: 0.04 K/UL (ref 0–0.04)
IMM GRANULOCYTES NFR BLD AUTO: 0.4 % (ref 0–0.5)
KETONES, UA POC OHS: NEGATIVE
LEUKOCYTES, UA POC OHS: ABNORMAL
LYMPHOCYTES # BLD AUTO: 1 K/UL (ref 1–4.8)
LYMPHOCYTES NFR BLD: 11.1 % (ref 18–48)
MCH RBC QN AUTO: 29.5 PG (ref 27–31)
MCHC RBC AUTO-ENTMCNC: 30.7 G/DL (ref 32–36)
MCV RBC AUTO: 96 FL (ref 82–98)
MICROSCOPIC COMMENT: ABNORMAL
MONOCYTES # BLD AUTO: 0.8 K/UL (ref 0.3–1)
MONOCYTES NFR BLD: 8.8 % (ref 4–15)
NEUTROPHILS # BLD AUTO: 7.1 K/UL (ref 1.8–7.7)
NEUTROPHILS NFR BLD: 76.3 % (ref 38–73)
NITRITE, UA POC OHS: POSITIVE
NON-SQ EPI CELLS #/AREA URNS AUTO: 4 /HPF
NRBC BLD-RTO: 0 /100 WBC
PH, UA POC OHS: 5.5
PLATELET # BLD AUTO: 252 K/UL (ref 150–450)
PMV BLD AUTO: 10.1 FL (ref 9.2–12.9)
POTASSIUM SERPL-SCNC: 4.5 MMOL/L (ref 3.5–5.1)
PROT SERPL-MCNC: 6.7 G/DL (ref 6–8.4)
PROTEIN, UA POC OHS: 100
RBC # BLD AUTO: 3.32 M/UL (ref 4–5.4)
RBC #/AREA URNS AUTO: 17 /HPF (ref 0–4)
SODIUM SERPL-SCNC: 141 MMOL/L (ref 136–145)
SPECIFIC GRAVITY, UA POC OHS: 1.02
UROBILINOGEN, UA POC OHS: 0.2
WBC # BLD AUTO: 9.36 K/UL (ref 3.9–12.7)
WBC #/AREA URNS AUTO: >100 /HPF (ref 0–5)
WBC CLUMPS UR QL AUTO: ABNORMAL

## 2024-07-02 PROCEDURE — 81003 URINALYSIS AUTO W/O SCOPE: CPT | Mod: QW,S$GLB,, | Performed by: UROLOGY

## 2024-07-02 PROCEDURE — 1159F MED LIST DOCD IN RCRD: CPT | Mod: CPTII,S$GLB,, | Performed by: UROLOGY

## 2024-07-02 PROCEDURE — 3078F DIAST BP <80 MM HG: CPT | Mod: CPTII,S$GLB,, | Performed by: UROLOGY

## 2024-07-02 PROCEDURE — 1100F PTFALLS ASSESS-DOCD GE2>/YR: CPT | Mod: CPTII,S$GLB,, | Performed by: UROLOGY

## 2024-07-02 PROCEDURE — 87086 URINE CULTURE/COLONY COUNT: CPT | Performed by: UROLOGY

## 2024-07-02 PROCEDURE — 85025 COMPLETE CBC W/AUTO DIFF WBC: CPT | Performed by: UROLOGY

## 2024-07-02 PROCEDURE — 81001 URINALYSIS AUTO W/SCOPE: CPT | Performed by: UROLOGY

## 2024-07-02 PROCEDURE — 87077 CULTURE AEROBIC IDENTIFY: CPT | Performed by: UROLOGY

## 2024-07-02 PROCEDURE — 3288F FALL RISK ASSESSMENT DOCD: CPT | Mod: CPTII,S$GLB,, | Performed by: UROLOGY

## 2024-07-02 PROCEDURE — 87088 URINE BACTERIA CULTURE: CPT | Performed by: UROLOGY

## 2024-07-02 PROCEDURE — 87186 SC STD MICRODIL/AGAR DIL: CPT | Performed by: UROLOGY

## 2024-07-02 PROCEDURE — 99999 PR PBB SHADOW E&M-EST. PATIENT-LVL V: CPT | Mod: PBBFAC,,, | Performed by: UROLOGY

## 2024-07-02 PROCEDURE — 3077F SYST BP >= 140 MM HG: CPT | Mod: CPTII,S$GLB,, | Performed by: UROLOGY

## 2024-07-02 PROCEDURE — 99204 OFFICE O/P NEW MOD 45 MIN: CPT | Mod: S$GLB,,, | Performed by: UROLOGY

## 2024-07-02 PROCEDURE — 1160F RVW MEDS BY RX/DR IN RCRD: CPT | Mod: CPTII,S$GLB,, | Performed by: UROLOGY

## 2024-07-02 PROCEDURE — 80053 COMPREHEN METABOLIC PANEL: CPT | Performed by: UROLOGY

## 2024-07-02 PROCEDURE — 36415 COLL VENOUS BLD VENIPUNCTURE: CPT | Performed by: UROLOGY

## 2024-07-02 PROCEDURE — 1125F AMNT PAIN NOTED PAIN PRSNT: CPT | Mod: CPTII,S$GLB,, | Performed by: UROLOGY

## 2024-07-02 RX ORDER — PHENAZOPYRIDINE HYDROCHLORIDE 100 MG/1
100 TABLET, FILM COATED ORAL 3 TIMES DAILY PRN
Qty: 10 TABLET | Refills: 0 | Status: SHIPPED | OUTPATIENT
Start: 2024-07-02 | End: 2024-07-12

## 2024-07-02 RX ORDER — DOXYCYCLINE HYCLATE 100 MG
100 TABLET ORAL 2 TIMES DAILY
Qty: 20 TABLET | Refills: 0 | Status: SHIPPED | OUTPATIENT
Start: 2024-07-02 | End: 2024-07-12

## 2024-07-02 NOTE — PATIENT INSTRUCTIONS
Assessment:     Lis Perdue is a 79 y.o. female     She has been having UTI for the past 3 months.  She also has blood in the urine for the last 2-3 years.  And some worsening kidney function.  At least since 2023.  She has no flank pain but I am concerned about a kidney stone and would like to schedule her for CT scan to day.  She could be having recurrent UTI due to diarrhea which she has not been evaluated for yet.  If her CT scan does not show any obstruction whether it is from stones or something else then will continue to follow her and evaluate her complete emptying.  Her residuals only 125 today it would not completely explain recurrent UTIs but her diarrhea could.  So I am referring her for Gastroenterology as well as Nephrology    SHORT PLAN (read below for details):  Stat CTU to evaluate for any obstructing stones.  If she can not get contrast because her kidney function is decreased will order a stat CT renal stone study instead  Send catheterized urine for urinalysis reflex  Will send in antibiotics (doxycycline) based on her last culture which was done on 6 for 24- doxy twice a day for 10 days  Sent in [facility]   Referring to Nephrology for kidney disease.  [place] like Nephrology  Referring to gastroenterology for diarrhea, has never seen anyone.  Diarrhea new over the last year.  Sent a message to Dr. Jacobo to see if they can set her up for follow-up  We will go ahead and schedule cystoscopy outpatient because of the blood in the urine.  Do not expect to find anything that would be the cause of UTI on cystoscopy with no history of any mesh  If she needs cystoscopies she needs it done Tuesdays. Will check with partner.   Can not get Estrace cream because she recently had breast cancere recently had breast cancer

## 2024-07-02 NOTE — PROGRESS NOTES
[facility]  PCP: No, Primary Doctor  DOS: 7/2/2024  Referred by:Eloisa Reed*      Initial consult by me in clinic on 7/2/24 for Recurrent uti's and incomplete emptying:    HPI: Lis Perdue is a very pleasant 79 y.o. female who is a new patient to our department referred for evaluation of recurrent urinary tract infections.     Previous UTI history:  Only started having UTIs about 3 months ago,  she did not have a culture at 1st.  She was prescribed 3 different antibiotics before a culture was sent.  Most recently she had a culture on 06/09/2024 and it grew Klebsiella.  She was prescribed Cipro which the culture was sensitive to.  Took it for 10 days and her UTI never cleared up.  Uti symptoms:  Pressure, pain with urination, cloudy urine.  no fevers.  No flank pain  UTI risk factors:   Previous bladder or vaginal surgery: none  Personal history of kidney stones: none  Previous abdominal imaging (ultrasound/CT): none  Family history of kidney stones: No.  Ambulatory, wheel chair bound:no  Urinary Incontinence episodes: Yes   She wears 2 thin pads a day for incontinence which does not appear to be stress no urge.  [place] - none  Pads: 2 thin pads  Diabetes: none  Bowel Incontinence episodes:  She has been having some chronic diarrhea for the past 6 months.  A few times a week.  She has not seen a gastroenterologist in 20 years.  Water intake: low.   Daytime frequency: holds urine all day at work . 2 cups of coffee a day.   History of breast cancer or any other gyn cancer: Yes - 1989.   Hysterectomy: No  In and out catheter performed today: Yes - cloudy urine, finished abx a few days ago.  125cc.  True residual.  She feels like she does not empty.  Takes her a long time to empty    She has some CKD that has been increasing since April 2023     Review of her previous urines dating back to 2018 showed that she started having blood in her urine in 2022.  She has had this now  3-4 times since then.      Urine history: family history of kidney, bladder or prostate cancer:No, personal or family history of kidney stones: No,tobacco use: No, anticoagulation: No.Female: hysterectomy no  26  Void: mod bld/100 prot/nit+/small wbc  24  K.pneum, void:mod bld/tr prot  22 Void: tr bld  22 Void: 1+bld  21  Void: leuk+  2/15/18 Void: neg    Past Medical History:   Diagnosis Date    Arthritis     RIGHT KNEE    Cancer     BREAST    GERD (gastroesophageal reflux disease)     Hypertension     Primary osteoarthritis of left hip 2018    Sciatica      Past Surgical History:   Procedure Laterality Date     SECTION      EPIDURAL STEROID INJECTION INTO LUMBAR SPINE N/A 2019    Procedure: Injection-steroid-epidural-lumbar L5/S1;  Surgeon: Alonso Birmingham MD;  Location: [facility] OR;  Service: Pain Management;  Laterality: N/A;    INJECTION OF ANESTHETIC AGENT AROUND MEDIAL BRANCH NERVES INNERVATING LUMBAR FACET JOINT Bilateral 2019    Procedure: Block-nerve-medial branch-lumbar L3,4,5;  Surgeon: Alonso Birmingham MD;  Location: [facility] OR;  Service: Pain Management;  Laterality: Bilateral;    JOINT REPLACEMENT Left 2018    MILA    KNEE ARTHROSCOPY      RIGHT KNEE SCOPE    MASTECTOMY Left     RADIOFREQUENCY ABLATION OF LUMBAR MEDIAL BRANCH NERVE AT SINGLE LEVEL Bilateral 2019    Procedure: Radiofrequency Ablation, Nerve, Spinal, Lumbar, Medial Branch L3,4,5;  Surgeon: Alonso Birmingham MD;  Location: [facility] OR;  Service: Pain Management;  Laterality: Bilateral;    TONSILLECTOMY       Review of patient's allergies indicates:   Allergen Reactions    Adhesive Blisters     Cloth tape causes blistering    Adhesive tape-silicones Other (See Comments)     Cloth tape causes blistering    Atorvastatin Diarrhea    Codeine Nausea And Vomiting    Nitrofurantoin Other (See Comments)     Flu-like symptoms    Sulfa (sulfonamide antibiotics) Nausea Only    Sulfasalazine Nausea  Only         Review of Systems:  As above.     Vitals:    07/02/24 1110   BP: (!) 141/72   Pulse: 109         Pertinent  exam in HPI        Assessment:     Lis Perdue is a 79 y.o. female     She has been having UTI for the past 3 months.  She also has blood in the urine for the last 2-3 years.  And some worsening kidney function.  At least since 2023.  She has no flank pain but I am concerned about a kidney stone and would like to schedule her for CT scan to day.  She could be having recurrent UTI due to diarrhea which she has not been evaluated for yet.  If her CT scan does not show any obstruction whether it is from stones or something else then will continue to follow her and evaluate her complete emptying.  Her residuals only 125 today it would not completely explain recurrent UTIs but her diarrhea could.  So I am referring her for Gastroenterology as well as Nephrology    SHORT PLAN (read below for details):  Stat CTU to evaluate for any obstructing stones.  If she can not get contrast because her kidney function is decreased will order a stat CT renal stone study instead  Send catheterized urine for urinalysis reflex  Will send in antibiotics (doxycycline) based on her last culture which was done on 6 for 24- doxy twice a day for 10 days  Sent in prydium   Referring to Nephrology for kidney disease.  [place] like Nephrology  Referring to gastroenterology for diarrhea, has never seen anyone.  Diarrhea new over the last year.  Sent a message to Dr. Jacobo to see if they can set her up for follow-up  We will go ahead and schedule cystoscopy outpatient because of the blood in the urine.  Do not expect to find anything that would be the cause of UTI on cystoscopy with no history of any mesh  If she needs cystoscopies (if ct negative) she needs it done Tuesdays. Will check with partner. Would want to do while on abx   Can not get Estrace cream because she recently had breast cancer

## 2024-07-02 NOTE — TELEPHONE ENCOUNTER
I reviewed the patient's labs.  Her creatinine is 2.2 and her blood counts are 9.8 and 31.9    Her last available creatinine before this was 1.2 her blood counts were 12.2 and 35.8 in August of 2023    Because she had blood in the urine, worsening kidney function in pain I recommended that she have a stat CT scan.  However they left because they were not able to get a CT scan for 3 hours and her daughter had to go to work.    They rescheduled to next Tuesday    I called the daughter inflamed that she should have stayed for the CT scan because I am concerned that she could have something going on like an obstructing stone which would be emergent.  However she stated the above about having to wait and inability to wait.  I explained that she should have a CT scan before next Tuesday and that she should go to the ER if she has not been well and they can evaluate her work her up for her abnormal labs and do any imaging at that time.  She says that her mother's pretty stubborn and that she probably will not do that.    I called and spoke with the mother and explained that her labs look worse than last time and that I really had needed her to stay for CT scan to evaluate if there is any obstructing stones.  She said that her bladder felt better with it empty but now it is filling again she has having a lot of pain again.    I recommended that she just go to the ER either this evening or in the morning and not eat or drink anything when she goes to the ER and let them evaluate her with CT scan if necessary.  Previously had ordered CT with contrast but with her renal insufficiency they will not be able to do that.  He has also been having chronic diarrhea which could be contributing to her renal insufficiency.  Her anemia is concerning with her chronic diarrhea and that needs to be further evaluated as well which could be done outpatient but was her feeling bad her worsening kidney function, her anemia and her pain and  persistent UTI at her age I think it is best if she just goes to the ER for evaluation.    However she is found to have an obstructing stone they will need to call the on-call urologist    I can go ahead and send antibiotics in but I think it is more important that she goes to the ER and they can start her on antibiotics there

## 2024-07-04 LAB — BACTERIA UR CULT: ABNORMAL

## 2024-07-05 ENCOUNTER — HOSPITAL ENCOUNTER (EMERGENCY)
Facility: HOSPITAL | Age: 79
Discharge: HOME | End: 2024-07-05
Attending: EMERGENCY MEDICINE
Payer: MEDICARE

## 2024-07-05 VITALS
OXYGEN SATURATION: 98 % | TEMPERATURE: 98 F | HEIGHT: 61 IN | RESPIRATION RATE: 17 BRPM | HEART RATE: 96 BPM | BODY MASS INDEX: 28.51 KG/M2 | WEIGHT: 151 LBS | DIASTOLIC BLOOD PRESSURE: 62 MMHG | SYSTOLIC BLOOD PRESSURE: 127 MMHG

## 2024-07-05 DIAGNOSIS — N30.90 BLADDER INFECTION: Primary | ICD-10-CM

## 2024-07-05 DIAGNOSIS — R53.83 FATIGUE: ICD-10-CM

## 2024-07-05 LAB
ALBUMIN SERPL BCP-MCNC: 3.2 G/DL (ref 3.5–5.2)
ALP SERPL-CCNC: 120 U/L (ref 55–135)
ALT SERPL W/O P-5'-P-CCNC: 15 U/L (ref 10–44)
ANION GAP SERPL CALC-SCNC: 12 MMOL/L (ref 8–16)
AST SERPL-CCNC: 16 U/L (ref 10–40)
BACTERIA #/AREA URNS HPF: ABNORMAL /HPF
BASOPHILS # BLD AUTO: 0.08 K/UL (ref 0–0.2)
BASOPHILS NFR BLD: 0.7 % (ref 0–1.9)
BILIRUB SERPL-MCNC: 0.5 MG/DL (ref 0.1–1)
BILIRUB UR QL STRIP: ABNORMAL
BUN SERPL-MCNC: 19 MG/DL (ref 8–23)
CALCIUM SERPL-MCNC: 9.6 MG/DL (ref 8.7–10.5)
CHLORIDE SERPL-SCNC: 108 MMOL/L (ref 95–110)
CLARITY UR: ABNORMAL
CO2 SERPL-SCNC: 21 MMOL/L (ref 23–29)
COLOR UR: YELLOW
CREAT SERPL-MCNC: 1.1 MG/DL (ref 0.5–1.4)
DIFFERENTIAL METHOD BLD: ABNORMAL
EOSINOPHIL # BLD AUTO: 0.1 K/UL (ref 0–0.5)
EOSINOPHIL NFR BLD: 1.2 % (ref 0–8)
ERYTHROCYTE [DISTWIDTH] IN BLOOD BY AUTOMATED COUNT: 13.7 % (ref 11.5–14.5)
EST. GFR  (NO RACE VARIABLE): 52 ML/MIN/1.73 M^2
GLUCOSE SERPL-MCNC: 101 MG/DL (ref 70–110)
GLUCOSE UR QL STRIP: NEGATIVE
HCT VFR BLD AUTO: 32.3 % (ref 37–48.5)
HGB BLD-MCNC: 10 G/DL (ref 12–16)
HGB UR QL STRIP: ABNORMAL
HYALINE CASTS #/AREA URNS LPF: 0 /LPF
IMM GRANULOCYTES # BLD AUTO: 0.14 K/UL (ref 0–0.04)
IMM GRANULOCYTES NFR BLD AUTO: 1.3 % (ref 0–0.5)
KETONES UR QL STRIP: NEGATIVE
LEUKOCYTE ESTERASE UR QL STRIP: ABNORMAL
LYMPHOCYTES # BLD AUTO: 1.6 K/UL (ref 1–4.8)
LYMPHOCYTES NFR BLD: 14.7 % (ref 18–48)
MCH RBC QN AUTO: 29.9 PG (ref 27–31)
MCHC RBC AUTO-ENTMCNC: 31 G/DL (ref 32–36)
MCV RBC AUTO: 96 FL (ref 82–98)
MICROSCOPIC COMMENT: ABNORMAL
MONOCYTES # BLD AUTO: 0.8 K/UL (ref 0.3–1)
MONOCYTES NFR BLD: 7.4 % (ref 4–15)
NEUTROPHILS # BLD AUTO: 8.1 K/UL (ref 1.8–7.7)
NEUTROPHILS NFR BLD: 74.7 % (ref 38–73)
NITRITE UR QL STRIP: POSITIVE
NRBC BLD-RTO: 0 /100 WBC
PH UR STRIP: 6 [PH] (ref 5–8)
PLATELET # BLD AUTO: 256 K/UL (ref 150–450)
PMV BLD AUTO: 9.6 FL (ref 9.2–12.9)
POTASSIUM SERPL-SCNC: 4.4 MMOL/L (ref 3.5–5.1)
PROT SERPL-MCNC: 6.4 G/DL (ref 6–8.4)
PROT UR QL STRIP: ABNORMAL
RBC # BLD AUTO: 3.35 M/UL (ref 4–5.4)
RBC #/AREA URNS HPF: 28 /HPF (ref 0–4)
SODIUM SERPL-SCNC: 141 MMOL/L (ref 136–145)
SP GR UR STRIP: 1.01 (ref 1–1.03)
SQUAMOUS #/AREA URNS HPF: 2 /HPF
UNIDENT CRYS URNS QL MICRO: 3
URN SPEC COLLECT METH UR: ABNORMAL
UROBILINOGEN UR STRIP-ACNC: ABNORMAL EU/DL
WBC # BLD AUTO: 10.79 K/UL (ref 3.9–12.7)
WBC #/AREA URNS HPF: >100 /HPF (ref 0–5)

## 2024-07-05 PROCEDURE — 87086 URINE CULTURE/COLONY COUNT: CPT | Performed by: EMERGENCY MEDICINE

## 2024-07-05 PROCEDURE — 85025 COMPLETE CBC W/AUTO DIFF WBC: CPT | Performed by: EMERGENCY MEDICINE

## 2024-07-05 PROCEDURE — 93005 ELECTROCARDIOGRAM TRACING: CPT

## 2024-07-05 PROCEDURE — 80053 COMPREHEN METABOLIC PANEL: CPT | Performed by: EMERGENCY MEDICINE

## 2024-07-05 PROCEDURE — 96365 THER/PROPH/DIAG IV INF INIT: CPT

## 2024-07-05 PROCEDURE — 93010 ELECTROCARDIOGRAM REPORT: CPT | Mod: ,,, | Performed by: INTERNAL MEDICINE

## 2024-07-05 PROCEDURE — 99285 EMERGENCY DEPT VISIT HI MDM: CPT | Mod: 25

## 2024-07-05 PROCEDURE — 25000003 PHARM REV CODE 250: Performed by: EMERGENCY MEDICINE

## 2024-07-05 PROCEDURE — 63600175 PHARM REV CODE 636 W HCPCS: Performed by: EMERGENCY MEDICINE

## 2024-07-05 PROCEDURE — 81000 URINALYSIS NONAUTO W/SCOPE: CPT | Performed by: EMERGENCY MEDICINE

## 2024-07-05 PROCEDURE — 36415 COLL VENOUS BLD VENIPUNCTURE: CPT | Performed by: EMERGENCY MEDICINE

## 2024-07-05 RX ADMIN — CEFTRIAXONE SODIUM 2 G: 2 INJECTION, POWDER, FOR SOLUTION INTRAMUSCULAR; INTRAVENOUS at 04:07

## 2024-07-05 NOTE — ED PROVIDER NOTES
Encounter Date: 2024       History     Chief Complaint   Patient presents with    Fatigue     Family reports fatigue for the past several days -- was seen by dr amaya and scheduled to have scan today     Most of the history is from her daughter Kianna at the bedside.  79-year-old female presents to the emergency room with generalized weakness for about a week that is worsening.  She has had several recent falls.  She fell at 4:00 a.m. and struck her head.  She has been having urinary symptoms for about 8 weeks.  Treated repeatedly for a bladder infection but it has never completely cleared up.  Recently saw Dr. Amaya who ordered outpatient labs and a CT but her creatinine was elevated at 2.2 a few days ago.  Urine culture demonstrates Klebsiella and she is on doxycycline but still has urinary symptoms.  No fevers or chills.  She does report generalized weakness.  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    The history is provided by the patient and a relative.     Review of patient's allergies indicates:   Allergen Reactions    Adhesive Blisters     Cloth tape causes blistering    Adhesive tape-silicones Other (See Comments)     Cloth tape causes blistering    Atorvastatin Diarrhea    Codeine Nausea And Vomiting    Nitrofurantoin Other (See Comments)     Flu-like symptoms    Sulfa (sulfonamide antibiotics) Nausea Only    Sulfasalazine Nausea Only     Past Medical History:   Diagnosis Date    Arthritis     RIGHT KNEE    Cancer     BREAST    GERD (gastroesophageal reflux disease)     Hypertension     Primary osteoarthritis of left hip 2018    Sciatica      Past Surgical History:   Procedure Laterality Date     SECTION      EPIDURAL STEROID INJECTION INTO LUMBAR SPINE N/A 2019    Procedure: Injection-steroid-epidural-lumbar L5/S1;  Surgeon: Alonso Birmingham MD;  Location: [facility];  Service: Pain Management;  Laterality: N/A;    INJECTION  OF ANESTHETIC AGENT AROUND MEDIAL BRANCH NERVES INNERVATING LUMBAR FACET JOINT Bilateral 11/5/2019    Procedure: Block-nerve-medial branch-lumbar L3,4,5;  Surgeon: Alonso Birmingham MD;  Location: [facility] OR;  Service: Pain Management;  Laterality: Bilateral;    JOINT REPLACEMENT Left 02/27/2018    MILA    KNEE ARTHROSCOPY      RIGHT KNEE SCOPE    MASTECTOMY Left     RADIOFREQUENCY ABLATION OF LUMBAR MEDIAL BRANCH NERVE AT SINGLE LEVEL Bilateral 11/26/2019    Procedure: Radiofrequency Ablation, Nerve, Spinal, Lumbar, Medial Branch L3,4,5;  Surgeon: Alonso Birmingham MD;  Location: [facility] OR;  Service: Pain Management;  Laterality: Bilateral;    TONSILLECTOMY       Family History   Problem Relation Name Age of Onset    Heart disease Mother      Cancer Mother          chest    Alzheimer's disease Father      Breast cancer Maternal Aunt       Social History     Tobacco Use    Smoking status: Never    Smokeless tobacco: Never   Substance Use Topics    Alcohol use: Yes     Alcohol/week: 0.0 standard drinks of alcohol     Comment: social; less than monthly    Drug use: No     Review of Systems   Constitutional:  Positive for fatigue.   Gastrointestinal: Negative.    Genitourinary:  Positive for dysuria and frequency.       Physical Exam     Initial Vitals [07/05/24 1400]   BP Pulse Resp Temp SpO2   127/62 96 17 98.3 °F (36.8 °C) 98 %      MAP       --         Physical Exam    Nursing note and vitals reviewed.  Constitutional: She appears well-developed and well-nourished. She is not diaphoretic. No distress.   HENT:   Head: Normocephalic and atraumatic.       Eyes: EOM are normal.   Neck: Neck supple.   Normal range of motion.   Full passive range of motion without pain.     Cardiovascular:  Normal rate, regular rhythm and normal heart sounds.     Exam reveals no gallop and no friction rub.       No murmur heard.  Pulmonary/Chest: Breath sounds normal. No respiratory distress. She has no wheezes. She has no rhonchi. She has  no rales.   Abdominal: She exhibits no distension. There is no abdominal tenderness. There is no rebound.   Musculoskeletal:         General: Normal range of motion.      Cervical back: Full passive range of motion without pain, normal range of motion and neck supple. No rigidity. No spinous process tenderness or muscular tenderness. Normal range of motion.     Neurological: She is alert and oriented to person, place, and time.   Skin: Skin is warm and dry.   Psychiatric: She has a normal mood and affect. Her behavior is normal. Judgment and thought content normal.         ED Course   Procedures  Labs Reviewed   CBC W/ AUTO DIFFERENTIAL   COMPREHENSIVE METABOLIC PANEL   URINALYSIS, REFLEX TO URINE CULTURE          Imaging Results    None          Medications - No data to display  Medical Decision Making  1640 79-year-old female presents to the emergency room for fatigue, persistent UTI symptoms for about 8 weeks off and on.  She is established with Urology but has not had any improvement despite multiple rounds of p.o. antibiotics.  She has follow-up scheduled on Tuesday.  She was scheduled for a CT with contrast last week but it was canceled because of her renal function.  She was referred to the emergency room several days ago but did not come until today.  Her creatinine has improved.  Family reports persistent fatigue.  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  She will go 8 hours without urinating at work.  ER workup is unremarkable including a head CT and metabolic panel.  CT renal protocol demonstrates cystitis.  She is well-appearing.  I did offer admission given the fatigue and the falls and the duration of symptoms but patient would like to go home.  Family is agreeable to this.  I do not think she has to be admitted. She will continue her p.o. doxycycline prescribed by Urology.  She was given IV Rocephin in the emergency room. Msg sent to urology. [EF]      Amount and/or Complexity of Data  Reviewed  Independent Historian:      Details: Daughter  External Data Reviewed: labs and notes.  Labs: ordered. Decision-making details documented in ED Course.  Radiology: ordered. Decision-making details documented in ED Course.               ED Course as of 07/05/24 1801 Fri Jul 05, 2024   1411 BP: 127/62 [EF]   1411 Temp: 98.3 °F (36.8 °C) [EF]   1411 Temp Source: Oral [EF]   1411 Pulse: 96 [EF]   1411 Resp: 17 [EF]   1411 SpO2: 98 % [EF]   1458 Sinus rhythm 92 beats per minute normal axis no ST elevation or depression no T-wave inversion independently interpreted [EF]   1526 BUN: 19 [EF]   1526 Creatinine: 1.1 [EF]   1532 WBC, UA(!): >100 [EF]   1532 NITRITE UA(!): Positive [EF]   1532 Leukocyte Esterase, UA(!): 3+ [EF]   1532 Squam Epithel, UA: 2 [EF]   1549 CT Head Without Contrast [EF]   1557 CT Renal Stone Study ABD Pelvis WO [EF]          ED Course User Index  [EF] Alvin Norton MD                           Clinical Impression:  Final diagnoses:  [R53.83] Fatigue                 Alvin Norton MD  07/05/24 1803

## 2024-07-05 NOTE — Clinical Note
"Lis Perdue (Barbara) was seen and treated in our emergency department on 7/5/2024.  She may return to work on 07/08/2024.       If you have any questions or concerns, please don't hesitate to call.       RN    "

## 2024-07-08 ENCOUNTER — TELEPHONE (OUTPATIENT)
Dept: UROLOGY | Facility: CLINIC | Age: 79
End: 2024-07-08
Payer: MEDICARE

## 2024-07-08 DIAGNOSIS — N39.0 URINARY TRACT INFECTION WITH HEMATURIA, SITE UNSPECIFIED: Primary | ICD-10-CM

## 2024-07-08 DIAGNOSIS — R31.9 URINARY TRACT INFECTION WITH HEMATURIA, SITE UNSPECIFIED: Primary | ICD-10-CM

## 2024-07-08 LAB
BACTERIA UR CULT: NO GROWTH
OHS QRS DURATION: 86 MS
OHS QTC CALCULATION: 467 MS

## 2024-07-08 RX ORDER — CIPROFLOXACIN 500 MG/1
500 TABLET ORAL EVERY 12 HOURS
Qty: 14 TABLET | Refills: 0 | Status: SHIPPED | OUTPATIENT
Start: 2024-07-08 | End: 2024-07-10

## 2024-07-08 NOTE — PROGRESS NOTES
Procedure Order to Urology [[id number]]    Electronically signed by: Loraine Amaya MD on 07/08/24 1304 Status: Active   Ordering user: Loraine Amaya MD 07/08/24 7744 Authorized by: Loraine Amaya MD   Ordering mode: Standard   Frequency:  07/08/24 -     Diagnoses  Urinary tract infection with hematuria, site unspecified [N39.0, R31.9]   Questionnaire    Question Answer   Procedure Cystoscopy Comment - monday july 22   Facility Name: [facility], Please order Urine POCT without micro . Local sedation (no urine Dr Nogueira or Dr guardado)

## 2024-07-10 ENCOUNTER — PATIENT MESSAGE (OUTPATIENT)
Dept: UROLOGY | Facility: CLINIC | Age: 79
End: 2024-07-10
Payer: MEDICARE

## 2024-07-10 ENCOUNTER — TELEPHONE (OUTPATIENT)
Dept: UROLOGY | Facility: CLINIC | Age: 79
End: 2024-07-10
Payer: MEDICARE

## 2024-07-10 RX ORDER — FLUCONAZOLE 150 MG/1
150 TABLET ORAL EVERY OTHER DAY
Qty: 2 TABLET | Refills: 0 | Status: SHIPPED | OUTPATIENT
Start: 2024-07-10 | End: 2024-07-15

## 2024-07-10 NOTE — TELEPHONE ENCOUNTER
Spoke pharmacy clarified directions on diflucan patient to take 1 now and repeat 7 days later. Verbally voiced understanding.

## 2024-07-10 NOTE — TELEPHONE ENCOUNTER
----- Message from Ave Monterroso sent at 7/10/2024  9:38 AM CDT -----  Contact: Pharmacy  Type:  Pharmacy Calling to Clarify an RX    Name of Caller:Pharmacy     Pharmacy Name:  [facility]. - [place], [place] - [address] [place] [address]  Phone: [phone] Fax: [phone]      Prescription Name:fluconazole (DIFLUCAN) 150 MG Tab    What do they need to clarify?:Script has two sets of directions     Best Call Back Number:see above     Additional Information: Please advise

## 2024-07-10 NOTE — TELEPHONE ENCOUNTER
Sent in diflucan  She CANNOT take any alprazolam/xanax or cipro within 24 houirs of taking the diflucan or can cause heart arrhythmias  If she has to take the xanax then she should just use monistat cream instead of the oral pill

## 2024-07-16 ENCOUNTER — PATIENT MESSAGE (OUTPATIENT)
Dept: UROLOGY | Facility: CLINIC | Age: 79
End: 2024-07-16
Payer: MEDICARE

## 2024-07-17 ENCOUNTER — TELEPHONE (OUTPATIENT)
Dept: UROLOGY | Facility: CLINIC | Age: 79
End: 2024-07-17
Payer: MEDICARE

## 2024-07-17 NOTE — TELEPHONE ENCOUNTER
----- Message from Ginger Zabala sent at 7/17/2024  9:21 AM CDT -----  Regarding: Needs return call/ referral sent  Type: Needs Medical Advice  Who Called:  Pt daughterSunil Birmingham Call Back Number: [phone]    Additional Information: Needs referral faxed to [facility], they need most recent test results, labs and clinic notes sent to them, at fax number [phone], please send asap. They wont schedule until they receive all of this please send.

## 2024-07-17 NOTE — TELEPHONE ENCOUNTER
Patient having problems with urination.   Offered urine sample patient declined  Offered appointment with np patient declined.  Patient states will keep cysto appointment.

## 2024-07-22 ENCOUNTER — TELEPHONE (OUTPATIENT)
Dept: UROLOGY | Facility: CLINIC | Age: 79
End: 2024-07-22
Payer: MEDICARE

## 2024-07-22 ENCOUNTER — HOSPITAL ENCOUNTER (OUTPATIENT)
Facility: HOSPITAL | Age: 79
Discharge: HOME | End: 2024-07-22
Attending: UROLOGY | Admitting: UROLOGY
Payer: MEDICARE

## 2024-07-22 DIAGNOSIS — R31.9 URINARY TRACT INFECTION WITH HEMATURIA, SITE UNSPECIFIED: ICD-10-CM

## 2024-07-22 DIAGNOSIS — R31.9 URINARY TRACT INFECTION WITH HEMATURIA, SITE UNSPECIFIED: Primary | ICD-10-CM

## 2024-07-22 DIAGNOSIS — N39.0 URINARY TRACT INFECTION WITH HEMATURIA, SITE UNSPECIFIED: Primary | ICD-10-CM

## 2024-07-22 DIAGNOSIS — N39.0 URINARY TRACT INFECTION WITH HEMATURIA, SITE UNSPECIFIED: ICD-10-CM

## 2024-07-22 DIAGNOSIS — M47.816 LUMBAR SPONDYLOSIS: Primary | ICD-10-CM

## 2024-07-22 LAB
BACTERIA #/AREA URNS HPF: ABNORMAL /HPF
BILIRUB UR QL STRIP: NEGATIVE
BILIRUBIN, UA POC OHS: NEGATIVE
BLOOD, UA POC OHS: ABNORMAL
CLARITY UR: CLEAR
CLARITY, UA POC OHS: CLEAR
COLOR UR: YELLOW
COLOR, UA POC OHS: YELLOW
GLUCOSE UR QL STRIP: NEGATIVE
GLUCOSE, UA POC OHS: NEGATIVE
HGB UR QL STRIP: ABNORMAL
HYALINE CASTS #/AREA URNS LPF: 3 /LPF
KETONES UR QL STRIP: NEGATIVE
KETONES, UA POC OHS: NEGATIVE
LEUKOCYTE ESTERASE UR QL STRIP: ABNORMAL
LEUKOCYTES, UA POC OHS: NEGATIVE
MICROSCOPIC COMMENT: ABNORMAL
NITRITE UR QL STRIP: NEGATIVE
NITRITE, UA POC OHS: NEGATIVE
PH UR STRIP: 6 [PH] (ref 5–8)
PH, UA POC OHS: 5.5
PROT UR QL STRIP: ABNORMAL
PROTEIN, UA POC OHS: 30
RBC #/AREA URNS HPF: 31 /HPF (ref 0–4)
SP GR UR STRIP: 1.01 (ref 1–1.03)
SPECIFIC GRAVITY, UA POC OHS: 1.02
SQUAMOUS #/AREA URNS HPF: 4 /HPF
URN SPEC COLLECT METH UR: ABNORMAL
UROBILINOGEN UR STRIP-ACNC: NEGATIVE EU/DL
UROBILINOGEN, UA POC OHS: 0.2
WBC #/AREA URNS HPF: 41 /HPF (ref 0–5)
WBC CLUMPS URNS QL MICRO: ABNORMAL

## 2024-07-22 PROCEDURE — 52000 CYSTOURETHROSCOPY: CPT | Mod: ,,, | Performed by: UROLOGY

## 2024-07-22 PROCEDURE — 81000 URINALYSIS NONAUTO W/SCOPE: CPT | Performed by: UROLOGY

## 2024-07-22 PROCEDURE — 88112 CYTOPATH CELL ENHANCE TECH: CPT | Mod: TC | Performed by: PATHOLOGY

## 2024-07-22 PROCEDURE — 25000003 PHARM REV CODE 250: Performed by: UROLOGY

## 2024-07-22 PROCEDURE — 52000 CYSTOURETHROSCOPY: CPT | Performed by: UROLOGY

## 2024-07-22 RX ORDER — LIDOCAINE HYDROCHLORIDE 20 MG/ML
JELLY TOPICAL
Status: DISCONTINUED | OUTPATIENT
Start: 2024-07-22 | End: 2024-07-22 | Stop reason: HOSPADM

## 2024-07-22 RX ORDER — CHOLECALCIFEROL (VITAMIN D3) 125 MCG
CAPSULE ORAL
COMMUNITY

## 2024-07-22 RX ORDER — LANOLIN ALCOHOL/MO/W.PET/CERES
1 CREAM (GRAM) TOPICAL
COMMUNITY

## 2024-07-22 NOTE — DISCHARGE INSTRUCTIONS
Your urologist is: Dr.Jennifer Amaya  Office number: [phone]  Address: [address] [place] [address], [address], [facility] [address]      PLEASE READ the following directions and contact our office with any questions via phone or the portal. If you were told that you need an appointment and no appointment was made, call the office the day after surgery and ask to speak with 's nurse to make an appointment.    Assesment: Lis Perdue is a 79 y.o. female with   1. Urinary tract infection with hematuria, site unspecified        Plan:   FOLLOW UP  with dr.dauterive NICHOLS for recurrent diarrhea x 6 months likely the cause of her recurrent uti's.   Fu in 8-9 weeks (3 weeks prior to next repeat cysto) WITH np TO CHECK PVR for symptom check, see if she's had any more uti'/cultures, check cath urine for ua, ua bethel and culture. Add pvr to fu visit with urology np in a few weeks  Repeat cysto on Monday October 21  Return for a nurse visit to check a pvr - work with daughters schedule      Fu ua bethel, culture, cytology    After the procedure    Drink plenty of fluids.  You may have burning or light bleeding when you urinate--this is normal.  Medications may be prescribed to ease any discomfort or prevent infection. Take these as directed.  Call your doctor if you have heavy bleeding or blood clots, burning that lasts more than a day, a fever over 100°F  (38° C), or trouble urinating.    After Surgery:  Always be aware that any surgery can cause these symptoms:    Pain- Medication can be prescribed for pain to decrease your pain but may not completely take your pain away.  Over the Counter pain medicine my be enough and you can always use Ice and rest to help ease pain.    Bleeding- a little bleeding after a surgery is usually within normal.  If there is a lot of blood you need to notify your MD.  Emergency treatments of bleeding are cold application, elevation of the bleeding site and compression.    Infection-  Infection after surgery is NOT a normal occurrence.  Signs of infection are fever, swelling, hot to touch the incision.  If this occurs notify your MD immediately.    Nausea- this can be common after a surgery especially if you have had anesthesia medicine or are taking pain medicine.  Staying on clear liquids, bland foods, gingerale, or over the counter anti nausea medicines can help.  If you vomit more than once, notify your MD.  Anti Nausea medicines can be prescribed.

## 2024-07-22 NOTE — TELEPHONE ENCOUNTER
----- Message from Loraine Amaya MD sent at 7/22/2024  4:18 PM CDT -----    · Fu in 8-9 weeks with urology np (3 weeks prior to next repeat cysto) for symptom check, see if she's had any more uti'/cultures, check cath urine for ua, ua bethel and culture.  · Repeat cysto on Monday October 21

## 2024-07-22 NOTE — DISCHARGE SUMMARY
[facility] - Periop Services  Urology  Discharge Note - Short Stay      Patient Name: Lis Perdue  MRN: [id number]  Discharge Date and Time:  07/22/2024 4:17 PM  Attending Physician: Loraine Amaya.*   Discharging Provider: Loraine Amaya MD  Primary Care Physician: Veronica Reed NP    There are no hospital problems to display for this patient.      Final Diagnoses: Same as principal problem.    Hospital Course: Patient was admitted for an outpatient procedure and tolerated the procedure well with no complications.*    Procedure(s) (LRB):  CYSTOSCOPY (N/A)     Indwelling Lines/Drains at time of discharge:   Lines/Drains/Airways       None                   Discharged Condition: good    Disposition: home    Follow Up:      Patient Instructions:      Procedure Order to Urology   Standing Status: Future Standing Exp. Date: 07/22/25     Order Specific Question Answer Comments   Procedure Cystoscopy 10/21/24 asu   Facility Name: [facility]        Medications:  Reconciled Home Medications:      Medication List        CONTINUE taking these medications      acetaminophen 500 MG tablet  Commonly known as: TYLENOL  Take 1,000 mg by mouth every 6 (six) hours as needed for Pain.     * ALPRAZolam 1 MG tablet  Commonly known as: XANAX  Take 1 mg by mouth.     amLODIPine 5 MG tablet  Commonly known as: NORVASC  Take 5 mg by mouth.     benazepriL 40 MG tablet  Commonly known as: LOTENSIN  Take 40 mg by mouth once daily.     biotin 5 mg Cap  Take 1 capsule by mouth once daily.     budesonide 0.5 mg/2 mL nebulizer solution  Commonly known as: PULMICORT  Inhale 0.25 mg into the lungs.     diclofenac 1.3 % Pt12  Commonly known as: FLECTOR  Diclofenac     * diclofenac 75 MG EC tablet  Commonly known as: VOLTAREN  Take 1 tablet (75 mg total) by mouth 2 (two) times daily as needed.     ezetimibe 10 mg tablet  Commonly known as: ZETIA  Take 10 mg by mouth once daily.     ferrous sulfate Tab  tablet  Commonly known as: FEOSOL  Take 1 tablet by mouth daily with breakfast.     furosemide 20 MG tablet  Commonly known as: LASIX  Take 1 tablet by mouth 2 (two) times daily.     gabapentin 300 MG capsule  Commonly known as: NEURONTIN  TAKE 1 CAPSULE BY MOUTH DAILY     hydroCHLOROthiazide 12.5 mg capsule  Commonly known as: MICROZIDE  Take 12.5 mg by mouth once daily.     * HYDROcodone-acetaminophen 5-325 mg per tablet  Commonly known as: NORCO  Take 1 tablet by mouth every 24 hours as needed for Pain. Greater than 7 day supply medically necessary     ibuprofen 800 MG tablet  Commonly known as: ADVIL,MOTRIN  Take 1 tablet (800 mg total) by mouth every 6 (six) hours as needed for Pain.     metoprolol tartrate 25 MG tablet  Commonly known as: LOPRESSOR  Take 25 mg by mouth 2 (two) times daily.     mometasone 0.1% 0.1 % cream  Commonly known as: ELOCON  Apply topically once daily.     mupirocin 2 % ointment  Commonly known as: BACTROBAN  Apply topically 2 (two) times daily as needed.     ondansetron 4 MG tablet  Commonly known as: ZOFRAN  Take 4 mg by mouth every 8 (eight) hours as needed. AS NEEDED FOR NAUSEA     pantoprazole 40 MG tablet  Commonly known as: PROTONIX  Take 40 mg by mouth.     predniSONE 20 MG tablet  Commonly known as: DELTASONE  2 po qam x 7 days then 1 po qam x 7 days, then 1/2 po qam x 8d     PROBIOTIC ACIDOPHILUS 250 million cell Cap  Generic drug: Lactobacillus acidophilus  Take by mouth.     semaglutide (weight loss) 1 mg/0.5 mL Pnij  Inject 1 mg into the skin every 7 days.     VITAMIN C 500 MG tablet  Generic drug: ascorbic acid (vitamin C)  Take 500 mg by mouth once daily.     vitamin D 1000 units Tab  Commonly known as: VITAMIN D3  Take 5,000 Units by mouth once daily.           * This list has 3 medication(s) that are the same as other medications prescribed for you. Read the directions carefully, and ask your doctor or other care provider to review them with you.                ASK your  doctor about these medications      * ALPRAZolam 0.5 MG tablet  Commonly known as: XANAX  Take 1 mg by mouth 2 (two) times daily as needed.     * diclofenac 75 MG EC tablet  Commonly known as: VOLTAREN  TAKE ONE TABLET BY MOUTH TWICE DAILY WITH FOOD     * HYDROcodone-acetaminophen 5-325 mg per tablet  Commonly known as: NORCO  Take 1 tablet by mouth every 24 hours as needed for Pain. Greater than 7 day supply medically necessary           * This list has 3 medication(s) that are the same as other medications prescribed for you. Read the directions carefully, and ask your doctor or other care provider to review them with you.                  Discharge Procedure Orders (must include Diet, Follow-up, Activity):   Discharge Procedure Orders (must include Diet, Follow-up, Activity)   Procedure Order to Urology   Standing Status: Future Standing Exp. Date: 07/22/25     Order Specific Question Answer Comments   Procedure Cystoscopy 10/21/24 asu   Facility Name: [facility]         Assesment: Lis Perdue is a 79 y.o. female with   1. Urinary tract infection with hematuria, site unspecified        Plan:   Fu with dr.dauterive Cid for recurrent diarrhea x 6 months likely the cause of her recurrent uti's.   Fu in 8-9 weeks (3 weeks prior to next repeat cysto) for symptom check, see if she's had any more uti'/cultures, check cath urine for ua, ua bethel and culture.  Repeat cysto on Monday October 21    Loraine Amaya MD  Urology  [facility] - Periop Services

## 2024-07-22 NOTE — TELEPHONE ENCOUNTER
----- Message from Loraine Amaya MD sent at 7/22/2024  4:21 PM CDT -----  Return for a nurse visit to check a pvr - work with daughters schedule    Add pvr to fu visit with urology np in a few weeks

## 2024-07-22 NOTE — PROGRESS NOTES
Procedure Order to Urology [[id number]]    Electronically signed by: Loraine Aamya MD on 07/22/24 1617 Status: Active   Ordering user: Loraine Amaya MD 07/22/24 1617 Ordering provider: Loraine Amaya MD   Authorized by: Loraine Amaya MD Ordering mode: Standard   Frequency:  07/22/24 -     Diagnoses  Urinary tract infection with hematuria, site unspecified [N39.0, R31.9]  Lumbar spondylosis [M47.816]   Questionnaire    Question Answer   Procedure Cystoscopy Comment - 10/21/24 asu   Facility Name: [facility], Please order Urine POCT without micro . Local sedation (no urine Dr Nogueira or Dr guardado)

## 2024-07-22 NOTE — H&P
[facility]- [facility]  PCP: Veronica Reed NP  DOS: 7/22/2024  Referred by:Loraine Amaya      Initial consult by me in clinic on 7/2/24 for Recurrent uti's and incomplete emptying:    HPI: Lis Perdue is a very pleasant 79 y.o. female who is a new patient to our department referred for evaluation of recurrent urinary tract infections.     Previous UTI history:  Only started having UTIs about 3 months ago,  she did not have a culture at 1st.  She was prescribed 3 different antibiotics before a culture was sent.  Most recently she had a culture on 06/09/2024 and it grew Klebsiella.  She was prescribed Cipro which the culture was sensitive to.  Took it for 10 days and her UTI never cleared up.  Uti symptoms:  Pressure, pain with urination, cloudy urine.  no fevers.  No flank pain  UTI risk factors:   Previous bladder or vaginal surgery: none  Personal history of kidney stones: none  Previous abdominal imaging (ultrasound/CT): none  Family history of kidney stones: No.  Ambulatory, wheel chair bound:no  Urinary Incontinence episodes: Yes   She wears 2 thin pads a day for incontinence which does not appear to be stress no urge.  Oab - none  Pads: 2 thin pads  Diabetes: none  Bowel Incontinence episodes:  She has been having some chronic diarrhea for the past 6 months.  A few times a week.  She has not seen a gastroenterologist in 20 years.  Water intake: low.   Daytime frequency: holds urine all day at work . 2 cups of coffee a day.   History of breast cancer or any other gyn cancer: Yes - 1989.   Hysterectomy: No  In and out catheter performed today: Yes - cloudy urine, finished abx a few days ago.  125cc.  True residual.  She feels like she does not empty.  Takes her a long time to empty    She has some CKD that has been increasing since April 2023     Review of her previous urines dating back to 2018 showed that she started having blood in her urine in 2022.  She has  had this now 3-4 times since then.    Interval history/H&P Update by me/ prior to cysto on 24 for recurrent uti.   Sent her for a ctu after last visit bc  of her kd dz and concern for an obstructing stone. She did not stay due to ride issues. Cr however that day  came back to 2.1 so I called and asked her to return. She went back on  and cr had come down to 1.1. ctrss that day 24 showed no stones and no hydro.   Her ucx from  grew k.pneum. sent in doxycyline to start. Repeat cx on  was no growth.  Referred her to npehrology and GI for diarrhea (sent msg to ).   Here today for cysto for MH.   Daughter says mother feels like she has to urinate frequently and spasms.     Urine history: family history of kidney, bladder or prostate cancer:No, personal or family history of kidney stones: No,tobacco use: No, anticoagulation: No.Female: hysterectomy no  24 Small bld/30 protein  24  Ng, void: 1+bld/3+leuk, nit+, 28 rbc/>100 wbc/occ bact/2 sq  24  K.pneum, Void: mod bld/100 prot/nit+/small wbc  24  K.pneum, void:mod bld/tr prot  22 Void: tr bld  22 Void: 1+bld  21  Void: leuk+  2/15/18 Void: neg    Past Medical History:   Diagnosis Date    Arthritis     RIGHT KNEE    Cancer     BREAST    GERD (gastroesophageal reflux disease)     Hypertension     Primary osteoarthritis of left hip 2018    Sciatica      Past Surgical History:   Procedure Laterality Date     SECTION      EPIDURAL STEROID INJECTION INTO LUMBAR SPINE N/A 2019    Procedure: Injection-steroid-epidural-lumbar L5/S1;  Surgeon: Alonso Birmingham MD;  Location: [facility] OR;  Service: Pain Management;  Laterality: N/A;    INJECTION OF ANESTHETIC AGENT AROUND MEDIAL BRANCH NERVES INNERVATING LUMBAR FACET JOINT Bilateral 2019    Procedure: Block-nerve-medial branch-lumbar L3,4,5;  Surgeon: Alonso Birmingham MD;  Location: [facility] OR;  Service: Pain Management;  Laterality:  Bilateral;    JOINT REPLACEMENT Left 02/27/2018    MILA    KNEE ARTHROSCOPY      RIGHT KNEE SCOPE    MASTECTOMY Left     RADIOFREQUENCY ABLATION OF LUMBAR MEDIAL BRANCH NERVE AT SINGLE LEVEL Bilateral 11/26/2019    Procedure: Radiofrequency Ablation, Nerve, Spinal, Lumbar, Medial Branch L3,4,5;  Surgeon: Alonso Birmingham MD;  Location: [facility];  Service: Pain Management;  Laterality: Bilateral;    TONSILLECTOMY       Review of patient's allergies indicates:   Allergen Reactions    Adhesive Blisters     Cloth tape causes blistering    Adhesive tape-silicones Other (See Comments)     Cloth tape causes blistering    Atorvastatin Diarrhea    Codeine Nausea And Vomiting    Nitrofurantoin Other (See Comments)     Flu-like symptoms    Sulfa (sulfonamide antibiotics) Nausea Only    Sulfasalazine Nausea Only         Review of Systems:  As above.     Vitals:    07/22/24 1552   BP: (!) 147/89   Pulse: 96   Resp: 18   Temp:        General:wdwn  in NAD  Neurologic: CN grossly normal. Normal sensation.   Psychiatric: awake, alert and oriented x 3. Mood and affect Normal. Cooperative.  Eyes: PERRLA, normal conjunctiva  Respiratory: no increased work on breathing. No wheezing.   Cardiovascular: No obvious extremity edema. Warm and well perfused.  GI: no obvious stomach distension  Musculoskeletal: normal  range of motion of bilateral upper extremities. Normal muscle strength and tone.  Skin: no obvious rashes or lesions. No tightening of skin noted.    Pertinent  exam in HPI      Assessment:     Lis Perdue is a 79 y.o. female     She has been having UTI for the past 3 months.  She also has blood in the urine for the last 2-3 years.  And some worsening kidney function.  At least since 2023.  She has no flank pain but I am concerned about a kidney stone and would like to schedule her for CT scan to day.  She could be having recurrent UTI due to diarrhea which she has not been evaluated for yet.  If her CT scan does not show  any obstruction whether it is from stones or something else then will continue to follow her and evaluate her complete emptying.  Her residuals only 125 today it would not completely explain recurrent UTIs but her diarrhea could.  So I am referring her for Gastroenterology as well as Nephrology    SHORT PLAN (read below for details):    cystoscopy outpatient because of the blood in the urine.  Do not expect to find anything that would be the cause of UTI on cystoscopy with no history of any mesh  If she needs cystoscopies (if ct negative) she needs it done Tuesdays. Will check with partner. Would want to do while on abx   Can not get Estrace cream because she recently had breast cancer  This patient has been cleared for surgery in ambulatory surgical facility.

## 2024-07-22 NOTE — OP NOTE
[facility]rology [place] Procedure Note- [facility]  Date: 07/22/2024    Procedure:   1. Flexible cysto-uretheroscopy .     Pre Procedure Diagnosis:   1. Urinary tract infection with hematuria, site unspecified        Post Procedure Diagnosis: same, see below for findings    Surgeon: Loraine Amaya MD    Specimen: cath urine sent for ua bethel, culture and cytology (just finished abx 4d ago but urine cloudy).     Anesthesia: 2% uro-jet lidocaine jelly for local analgesia    Indications: Lis Perdue is a 79 y.o. female  With above pre-procedure diagnosis. Urine reviewed. H&P reviewed.     Procedure in detail:   Flexible cysto-urethroscopy was performed after consent was obtained.  The risks and benefits were explained.    2% lidocaine urojet was used for local analgesia.  The genitalia was prepped and draped in the sterile fashion with betadine.    The flexible scope was advanced into the urethra and into the bladder.  Bilateral ureteral orifice were evaluated and noted to be normal with clear efflux.  The bladder was completely surveyed in a systematic fashion and the scope was retroflexed.    Cystoscopy findings as below in findings.   No strictures were noted.     The patient tolerated the procedure well without complication.    Findings: (pictures were uploaded into media)  Posterior bladder wall and dome - 3cm patch of red flat urothelium. Looks inflammatory. Low risk for bladder cancer and just had a uti she finished abx for. Decided to hold off on biopsy and repeat cysto in 3 months Send cath urine which was slightly cloudy for ua bethel, culture and cytology.             dome        Assesment: iLs Perdue is a 79 y.o. female with   1. Urinary tract infection with hematuria, site unspecified        Plan:   FOLLOW UP  with dr.dauterive Cid for recurrent diarrhea x 6 months likely the cause of her recurrent uti's.   Fu in 8-9 weeks (3 weeks prior to next repeat cysto) WITH np TO CHECK PVR for symptom check, see  if she's had any more uti'/cultures, check cath urine for ua, ua bethel and culture. Add pvr to fu visit with urology np in a few weeks  Repeat cysto on Monday October 21  Return for a nurse visit to check a pvr - work with daughters schedule      Fu ua bethel, culture, cytology    Loraine Amaya MD

## 2024-07-23 VITALS
TEMPERATURE: 97 F | SYSTOLIC BLOOD PRESSURE: 135 MMHG | DIASTOLIC BLOOD PRESSURE: 77 MMHG | HEIGHT: 61 IN | RESPIRATION RATE: 18 BRPM | HEART RATE: 88 BPM | WEIGHT: 151 LBS | BODY MASS INDEX: 28.51 KG/M2 | OXYGEN SATURATION: 98 %

## 2024-08-09 ENCOUNTER — PATIENT MESSAGE (OUTPATIENT)
Dept: UROLOGY | Facility: CLINIC | Age: 79
End: 2024-08-09
Payer: MEDICARE

## 2024-09-01 NOTE — TELEPHONE ENCOUNTER
I had her do a creatinine and it came back elevated at 2.1     I had wanted her  do a CT urogram to evaluate for any obstructing stones at that time but she left.  I told her to go back to the ER because he was worried that with the worsening kidney function that she had a stone.  By the time she went back to the ER on 07/05/2024 her creatinine had come back down to normal in his CT renal stone study was done and it did not show any hydronephrosis or any obvious stones but she does have some calcification areas of the ureter on close the ureter but no obstruction    I still think that we should do a cystoscopy and I can schedule her for that for the 22nd and would like her to do a CT urogram the morning if her kidney function is normal to check for any stones in her ureter.  If she is unable to do this then can wait a bit that I think that her quite diarrhea is likely contributing to the recurrent UTIs and she really needs to see Gastroenterology for this    She can discontinue the doxycycline and take cipro twice a day for 14 days until her cysto on Monday July 22nd    Short plan:  Cysto Monday July 22  Dc doxy and start cipro  Can postpone the ctu to the following Monday-      Recent Labs   Lab 07/02/24  1230 07/05/24  1450   WBC 9.36 10.79   Hemoglobin 9.8 L 10.0 L   Hematocrit 31.9 L 32.3 L   Platelets 252 256   ]  Recent Labs   Lab 07/02/24  1230 07/05/24  1450   Sodium 141 141   Potassium 4.5 4.4   Chloride 110 108   CO2 19 L 21 L   BUN 43 H 19   Creatinine 2.1 H  2.1 H 1.1   Glucose 92 101   Calcium 9.5 9.6   Alkaline Phosphatase 129 120   Total Protein 6.7 6.4   Albumin 3.5 3.2 L   Total Bilirubin 0.8 0.5   AST 19 16   ALT 17 15   ]    Lab Results   Component Value Date    HGBA1C 4.9 10/18/2022       
----- Message from Milka Macias sent at 7/8/2024  8:55 AM CDT -----  Type: Needs Medical Advice  Who Called:  pt's daughter  Best Call Back Number: [phone]  Additional Information: pt's daughter is calling the office to speak with Ember in regards to her ED visit and the test results that came from that visit. She is asking what the next steps are and if the Dr wants to changed any plans that were going to happen. Please call back to advise. Thanks!  
See emergency room visit  
Spoke with patient's daughter informed her of recommendations. Verbally voiced understanding.  
[FreeTextEntry2] : FU Cervical and lumbar pain  DOI 4/17/2024
[FreeTextEntry2] : FU Cervical and lumbar pain  DOI 4/17/2024

## 2024-10-09 ENCOUNTER — TELEPHONE (OUTPATIENT)
Dept: UROLOGY | Facility: CLINIC | Age: 79
End: 2024-10-09
Payer: MEDICARE

## 2024-10-09 NOTE — TELEPHONE ENCOUNTER
----- Message from Loraine Amaya MD sent at 10/8/2024  6:11 PM CDT -----  Plan at last cysto in July   Looks like she canceled her appointment with Marleny  And she never came for PVR by in and out catheterization    Please reschedule her to October 28th with a cath urine for UA and culture and a PVR 10-14 days prior to the 28th    If she does not answer try to get in touch with the daughter or send them a message     Plan:   · FOLLOW UP  with dr.dauterive Cid for recurrent diarrhea x 6 months likely the cause of her recurrent uti's.   · Fu in 8-9 weeks (3 weeks prior to next repeat cysto) WITH np TO CHECK PVR for symptom check, see if she's had any more uti'/cultures, check cath urine for ua, ua bethel and culture. Add pvr to fu visit with urology np in a few weeks  · Repeat cysto on Monday October 21  · Return for a nurse visit to check a pvr - work with daughters schedule        a

## 2024-10-09 NOTE — TELEPHONE ENCOUNTER
Phoned patient no answer left message on voicemail to give the office a call back.    Spoke with patient's daughter informed of results and recommendations.   Daughter states she is currently in the process of following up with other first. Patient was seen by gyn 2 weeks ago and then she has a upcoming appointment to establish with GI for diarrhea. Daughter states she will like to follow these recommendations first before repeating procedure.

## 2024-10-15 ENCOUNTER — TELEPHONE (OUTPATIENT)
Dept: UROLOGY | Facility: CLINIC | Age: 79
End: 2024-10-15
Payer: MEDICARE

## 2024-10-15 NOTE — TELEPHONE ENCOUNTER
----- Message from Ingrid sent at 10/15/2024  8:51 AM CDT -----  Contact: pt  Type: Needs Medical Advice         Who Called:pt  Best Call Back Number:[phone]  Additional Information: Requesting a call back regarding pt needs to cancel her procedure. Pt said she was recommended to go to gastro and she said she is just now able to get in to gastro to be seen for the issues. Pt said she will call back after her gastro visit   Please Advise- Thank you

## 2024-10-16 DIAGNOSIS — R10.84 ABDOMINAL PAIN, GENERALIZED: Primary | ICD-10-CM

## 2024-11-12 DIAGNOSIS — Z12.31 ENCOUNTER FOR SCREENING MAMMOGRAM FOR MALIGNANT NEOPLASM OF BREAST: Primary | ICD-10-CM

## 2024-11-26 ENCOUNTER — HOSPITAL ENCOUNTER (OUTPATIENT)
Dept: RADIOLOGY | Facility: HOSPITAL | Age: 79
Discharge: HOME | End: 2024-11-26
Attending: OBSTETRICS & GYNECOLOGY
Payer: MEDICARE

## 2024-11-26 DIAGNOSIS — Z12.31 ENCOUNTER FOR SCREENING MAMMOGRAM FOR MALIGNANT NEOPLASM OF BREAST: ICD-10-CM

## 2024-11-26 PROCEDURE — 77063 BREAST TOMOSYNTHESIS BI: CPT | Mod: 26,52,, | Performed by: RADIOLOGY

## 2024-11-26 PROCEDURE — 77067 SCR MAMMO BI INCL CAD: CPT | Mod: 26,52,, | Performed by: RADIOLOGY

## 2024-11-26 PROCEDURE — 77067 SCR MAMMO BI INCL CAD: CPT | Mod: TC,52,PO

## 2024-12-03 ENCOUNTER — HOSPITAL ENCOUNTER (OUTPATIENT)
Dept: PREADMISSION TESTING | Facility: HOSPITAL | Age: 79
Discharge: HOME | End: 2024-12-03
Attending: INTERNAL MEDICINE
Payer: MEDICARE

## 2024-12-03 VITALS
TEMPERATURE: 98 F | RESPIRATION RATE: 16 BRPM | SYSTOLIC BLOOD PRESSURE: 155 MMHG | WEIGHT: 137 LBS | DIASTOLIC BLOOD PRESSURE: 81 MMHG | HEIGHT: 61 IN | OXYGEN SATURATION: 98 % | BODY MASS INDEX: 25.86 KG/M2 | HEART RATE: 86 BPM

## 2024-12-03 DIAGNOSIS — Z01.818 PREOP TESTING: Primary | ICD-10-CM

## 2024-12-03 LAB
ANION GAP SERPL CALC-SCNC: 8 MMOL/L (ref 8–16)
BASOPHILS # BLD AUTO: 0.06 K/UL (ref 0–0.2)
BASOPHILS NFR BLD: 0.8 % (ref 0–1.9)
BUN SERPL-MCNC: 30 MG/DL (ref 8–23)
CALCIUM SERPL-MCNC: 9.1 MG/DL (ref 8.7–10.5)
CHLORIDE SERPL-SCNC: 108 MMOL/L (ref 95–110)
CO2 SERPL-SCNC: 26 MMOL/L (ref 23–29)
CREAT SERPL-MCNC: 1.2 MG/DL (ref 0.5–1.4)
DIFFERENTIAL METHOD BLD: ABNORMAL
EOSINOPHIL # BLD AUTO: 0.1 K/UL (ref 0–0.5)
EOSINOPHIL NFR BLD: 1.7 % (ref 0–8)
ERYTHROCYTE [DISTWIDTH] IN BLOOD BY AUTOMATED COUNT: 15.6 % (ref 11.5–14.5)
EST. GFR  (NO RACE VARIABLE): 46.6 ML/MIN/1.73 M^2
GLUCOSE SERPL-MCNC: 94 MG/DL (ref 70–110)
HCT VFR BLD AUTO: 33.9 % (ref 37–48.5)
HGB BLD-MCNC: 10.5 G/DL (ref 12–16)
IMM GRANULOCYTES # BLD AUTO: 0.02 K/UL (ref 0–0.04)
IMM GRANULOCYTES NFR BLD AUTO: 0.3 % (ref 0–0.5)
LYMPHOCYTES # BLD AUTO: 1.3 K/UL (ref 1–4.8)
LYMPHOCYTES NFR BLD: 16.2 % (ref 18–48)
MCH RBC QN AUTO: 29.1 PG (ref 27–31)
MCHC RBC AUTO-ENTMCNC: 31 G/DL (ref 32–36)
MCV RBC AUTO: 94 FL (ref 82–98)
MONOCYTES # BLD AUTO: 0.5 K/UL (ref 0.3–1)
MONOCYTES NFR BLD: 6.8 % (ref 4–15)
NEUTROPHILS # BLD AUTO: 5.8 K/UL (ref 1.8–7.7)
NEUTROPHILS NFR BLD: 74.2 % (ref 38–73)
NRBC BLD-RTO: 0 /100 WBC
PLATELET # BLD AUTO: 248 K/UL (ref 150–450)
PMV BLD AUTO: 10.1 FL (ref 9.2–12.9)
POTASSIUM SERPL-SCNC: 4.5 MMOL/L (ref 3.5–5.1)
RBC # BLD AUTO: 3.61 M/UL (ref 4–5.4)
SODIUM SERPL-SCNC: 142 MMOL/L (ref 136–145)
WBC # BLD AUTO: 7.8 K/UL (ref 3.9–12.7)

## 2024-12-03 PROCEDURE — 80048 BASIC METABOLIC PNL TOTAL CA: CPT | Performed by: ANESTHESIOLOGY

## 2024-12-03 PROCEDURE — 85025 COMPLETE CBC W/AUTO DIFF WBC: CPT | Performed by: ANESTHESIOLOGY

## 2024-12-03 RX ORDER — TIZANIDINE 4 MG/1
4 TABLET ORAL EVERY 6 HOURS PRN
COMMUNITY

## 2024-12-03 NOTE — PRE ADMISSION SCREENING
Preadmit assessment complete. Review of patient health history and home medications. Questions answered. Patient voiced understanding. Preop education per AVS

## 2024-12-03 NOTE — DISCHARGE INSTRUCTIONS
To confirm, Your doctor has instructed you that surgery is scheduled for:  Tuesday 12/10/24    Endoscopy  will call the afternoon prior to surgery with the final arrival time.      Please report to Outpatient Registration the morning of surgery.     Do not eat anything after midnight the night before your surgery - You may have clear liquids up to 2 hours before coming in for the procedure. This includes water, Gatorade, clear juice, black coffee and tea      Follow the preop given by the Doctor    YOU MAY BRUSH YOUR TEETH BUT DO NOT SWALLOW     TAKE ONLY THESE MEDICATIONS WITH A SMALL SIP OF WATER THE MORNING OF YOUR PROCEDURE: see medication list      PLEASE NOTE:  The surgery schedule has many variables which may affect the time of your surgery case.  Family members should be available if your surgery time changes.  Plan to be here the day of your procedure between 2-3 hours.    DO NOT TAKE THESE MEDICATIONS 5-7 DAYS PRIOR to your procedure or per your surgeon's request: ASPIRIN, ALEVE, ADVIL, IBUPROFEN,  FABRICE SELTZER, BC , FISH OIL , VITAMIN E, HERBALS  (May take Tylenol)    ONLY if you are prescribed any types of blood thinners such as:  Aspirin, Coumadin, Plavix, Pradaxa, Xarelto, Aggrenox, Effient, Eliquis, Savasya, Brilinta, or any other, ask your surgeon whether you should stop taking them and how long before surgery you should stop.  You may also need to verify with the prescribing physician if it is ok to stop your medication.                                                       IMPORTANT INSTRUCTIONS    Do not smoke, vape or drink alcoholic beverages 24 hours prior to your procedure.  Shower the night before with  Dial antibacterial soap from the neck down.   You may use your own shampoo and face wash. This helps your skin to be as bacteria free as possible.    If you wear contact lenses, dentures, hearing aids or glasses, bring a container to put them in during surgery and give to a family member for  safe keeping.    Please leave all jewelry, piercing's and valuables at home.   ONLY if you wear home oxygen please bring your portable oxygen tank the day of your procedure.   ONLY for patients requiring bowel prep, written instructions will be given by your doctor's office.  Make arrangements in advance for transportation home by a responsible adult.  You must make arrangements for transportation, TAXI'S, UBER'S OR LYFTS ARE NOT ALLOWED.        If you have any questions about these instructions, call Pre-Op Admit  Nursing at [phone] or the Endoscopy Department at [phone]

## 2024-12-10 ENCOUNTER — HOSPITAL ENCOUNTER (OUTPATIENT)
Facility: HOSPITAL | Age: 79
Discharge: HOME | End: 2024-12-10
Attending: INTERNAL MEDICINE | Admitting: INTERNAL MEDICINE
Payer: MEDICARE

## 2024-12-10 ENCOUNTER — ANESTHESIA EVENT (OUTPATIENT)
Dept: SURGERY | Facility: HOSPITAL | Age: 79
End: 2024-12-10
Payer: MEDICARE

## 2024-12-10 ENCOUNTER — ANESTHESIA (OUTPATIENT)
Dept: SURGERY | Facility: HOSPITAL | Age: 79
End: 2024-12-10
Payer: MEDICARE

## 2024-12-10 VITALS
HEART RATE: 85 BPM | HEIGHT: 61 IN | TEMPERATURE: 97 F | RESPIRATION RATE: 16 BRPM | BODY MASS INDEX: 25.86 KG/M2 | SYSTOLIC BLOOD PRESSURE: 156 MMHG | DIASTOLIC BLOOD PRESSURE: 73 MMHG | WEIGHT: 137 LBS | OXYGEN SATURATION: 100 %

## 2024-12-10 DIAGNOSIS — K58.9 IRRITABLE BOWEL SYNDROME: ICD-10-CM

## 2024-12-10 DIAGNOSIS — K56.699 COLON STRICTURE: Primary | ICD-10-CM

## 2024-12-10 PROCEDURE — 27200043 HC FORCEPS, BIOPSY: Performed by: INTERNAL MEDICINE

## 2024-12-10 PROCEDURE — 63600175 PHARM REV CODE 636 W HCPCS

## 2024-12-10 PROCEDURE — 25000003 PHARM REV CODE 250

## 2024-12-10 PROCEDURE — 45380 COLONOSCOPY AND BIOPSY: CPT | Performed by: INTERNAL MEDICINE

## 2024-12-10 PROCEDURE — 88305 TISSUE EXAM BY PATHOLOGIST: CPT | Mod: TC | Performed by: PATHOLOGY

## 2024-12-10 PROCEDURE — 27201028 HC NEEDLE, SCLERO: Performed by: INTERNAL MEDICINE

## 2024-12-10 PROCEDURE — 37000008 HC ANESTHESIA 1ST 15 MINUTES: Performed by: INTERNAL MEDICINE

## 2024-12-10 PROCEDURE — 45381 COLONOSCOPY SUBMUCOUS NJX: CPT | Performed by: INTERNAL MEDICINE

## 2024-12-10 PROCEDURE — 27202343 HC ENDOSCOPIC MARKER: Performed by: INTERNAL MEDICINE

## 2024-12-10 PROCEDURE — 37000009 HC ANESTHESIA EA ADD 15 MINS: Performed by: INTERNAL MEDICINE

## 2024-12-10 RX ORDER — PROPOFOL 10 MG/ML
VIAL (ML) INTRAVENOUS
Status: DISCONTINUED | OUTPATIENT
Start: 2024-12-10 | End: 2024-12-10

## 2024-12-10 RX ADMIN — PROPOFOL 80 MG: 10 INJECTION, EMULSION INTRAVENOUS at 10:12

## 2024-12-10 RX ADMIN — PROPOFOL 50 MG: 10 INJECTION, EMULSION INTRAVENOUS at 10:12

## 2024-12-10 RX ADMIN — SODIUM CHLORIDE: 0.9 INJECTION, SOLUTION INTRAVENOUS at 10:12

## 2024-12-10 NOTE — TRANSFER OF CARE
"Anesthesia Transfer of Care Note    Patient: Lis Perdue    Procedure(s) Performed: Procedure(s) (LRB):  COLONOSCOPY (N/A)    Patient location: GI    Anesthesia Type: general    Transport from OR: Transported from OR on room air with adequate spontaneous ventilation    Post pain: adequate analgesia    Post assessment: no apparent anesthetic complications and tolerated procedure well    Post vital signs: stable    Level of consciousness: sedated    Nausea/Vomiting: no nausea/vomiting    Complications: none    Transfer of care protocol was followed      Last vitals: Visit Vitals  BP (!) 156/73 (BP Location: Left arm, Patient Position: Lying)   Pulse 85   Temp 36.2 °C (97.1 °F) (Temporal)   Resp 16   Ht 5' 1" (1.549 m)   Wt 62.1 kg (137 lb)   SpO2 100%   BMI 25.89 kg/m²     "

## 2024-12-10 NOTE — [employer]ATION PATIENT INSTRUCTIONS
Discharge Summary/Instructions after an Endoscopic Procedure  Patient Name: Lis Perdue  Patient MRN: [id number]  Patient YOB: 1945  Tuesday, December 10, 2024  Rajinder Jacobo III, MD  RESTRICTIONS:  During your procedure today, you received medications for sedation.  These   medications may affect your judgment, balance and coordination.  Therefore,   for 24 hours, you have the following restrictions:   - DO NOT drive a car, operate machinery, make legal/financial decisions,   sign important papers or drink alcohol.    ACTIVITY:  Today: no heavy lifting, straining or running due to procedural   sedation/anesthesia.  The following day: return to full activity including work.  DIET:  Eat and drink normally unless instructed otherwise.     TREATMENT FOR COMMON SIDE EFFECTS:  - Mild abdominal pain, nausea, belching, bloating or excessive gas:  rest,   eat lightly and use a heating pad.  - Sore Throat: treat with throat lozenges and/or gargle with warm salt   water.  - Because air was used during the procedure, expelling large amounts of air   from your rectum or belching is normal.  - If a bowel prep was taken, you may not have a bowel movement for 1-3 days.    This is normal.  SYMPTOMS TO WATCH FOR AND REPORT TO YOUR PHYSICIAN:  1. Abdominal pain or bloating, other than gas cramps.  2. Chest pain.  3. Back pain.  4. Signs of infection such as: chills or fever occurring within 24 hours   after the procedure.  5. Rectal bleeding, which would show as bright red, maroon, or black stools.   (A tablespoon of blood from the rectum is not serious, especially if   hemorrhoids are present.)  6. Vomiting.  7. Weakness or dizziness.  GO DIRECTLY TO THE NEAREST EMERGENCY ROOM IF YOU HAVE ANY OF THE FOLLOWING:      Difficulty breathing              Chills and/or fever over 101 F   Persistent vomiting and/or vomiting blood   Severe abdominal pain   Severe chest pain   Black, tarry stools   Bleeding- more than one  tablespoon   Any other symptom or condition that you feel may need urgent attention  Your doctor recommends these additional instructions:  If any biopsies were taken, your doctors clinic will contact you in 1 to 2   weeks with any results.  - Discharge patient to home (ambulatory).   - Patient has a contact number available for emergencies.  The signs and   symptoms of potential delayed complications were discussed with the   patient.  Return to normal activities tomorrow.  Written discharge   instructions were provided to the patient.   - Resume previous diet.   - Continue present medications.   - Refer to surgery for R hemicolectomy. Two separate strictures suggestive   of Crohn's or some other healed colitis. Favor Crohn's wili w/ elevated   calpro, change in BM and strictures.   - Will also get CTE to r/o small bowel strictures as part of pre-op   planning.   - If the pathology report is benign, then repeat colonoscopy for   surveillance after studies are complete.  For questions, problems or results please call your physician - Rajinder Jacobo III, MD at Work:  [phone].  [facility], EMERGENCY ROOM PHONE NUMBER: [phone]  IF A COMPLICATION OR EMERGENCY SITUATION ARISES AND YOU ARE UNABLE TO REACH   YOUR PHYSICIAN - GO DIRECTLY TO THE EMERGENCY ROOM.  Rajinder Jacobo III, MD  12/10/2024 10:46:33 AM  This report has been verified and signed electronically.  Dear patient,  As a result of recent federal legislation (The Federal Cures Act), you may   receive lab or pathology results from your procedure in your [facility]   account before your physician is able to contact you. Your physician or   their representative will relay the results to you with their   recommendations at their soonest availability.  Thank you,  PROVATION

## 2024-12-10 NOTE — H&P
GASTROENTEROLOGY PRE-PROCEDURE H&P NOTE  Patient Name: Lis Perdue  Patient MRN: [id number]  Patient : 1945    Service date: 12/10/2024    PCP: Veronica Reed NP    No chief complaint on file.      HPI: Patient is a 79 y.o. female with PMHx as below here for evaluation of change in BM: elevated calpro .     Past Medical History:  Past Medical History:   Diagnosis Date    Arthritis     RIGHT KNEE    Cancer     BREAST    GERD (gastroesophageal reflux disease)     H/O colonoscopy     Hypertension     Primary osteoarthritis of left hip 2018    Sciatica         Past Surgical History:  Past Surgical History:   Procedure Laterality Date     SECTION      CYSTOSCOPY N/A 2024    Procedure: CYSTOSCOPY;  Surgeon: Loraine Amaya MD;  Location: [facility] OR;  Service: Urology;  Laterality: N/A;    EPIDURAL STEROID INJECTION INTO LUMBAR SPINE N/A 2019    Procedure: Injection-steroid-epidural-lumbar L5/S1;  Surgeon: Alonso Birmingham MD;  Location: [facility] OR;  Service: Pain Management;  Laterality: N/A;    INJECTION OF ANESTHETIC AGENT AROUND MEDIAL BRANCH NERVES INNERVATING LUMBAR FACET JOINT Bilateral 2019    Procedure: Block-nerve-medial branch-lumbar L3,4,5;  Surgeon: Alonso Birmingham MD;  Location: [facility] OR;  Service: Pain Management;  Laterality: Bilateral;    JOINT REPLACEMENT Left 2018    MILA    KNEE ARTHROSCOPY      RIGHT KNEE SCOPE    MASTECTOMY Left     RADIOFREQUENCY ABLATION OF LUMBAR MEDIAL BRANCH NERVE AT SINGLE LEVEL Bilateral 2019    Procedure: Radiofrequency Ablation, Nerve, Spinal, Lumbar, Medial Branch L3,4,5;  Surgeon: Alonso Birmingham MD;  Location: [facility] OR;  Service: Pain Management;  Laterality: Bilateral;    SHOULDER ARTHROSCOPY Right     TONSILLECTOMY          Home Medications:  Medications Prior to Admission   Medication Sig Dispense Refill Last Dose/Taking    acetaminophen (TYLENOL) 500 MG tablet Take 1,000 mg by mouth every  6 (six) hours as needed for Pain.       alprazolam (XANAX) 0.5 MG tablet Take 1 mg by mouth nightly as needed.  2     ALPRAZolam (XANAX) 1 MG tablet Take 1 mg by mouth.       amLODIPine (NORVASC) 5 MG tablet Take 10 mg by mouth.       ascorbic acid, vitamin C, (VITAMIN C) 500 MG tablet Take 500 mg by mouth once daily.       benazepril (LOTENSIN) 40 MG tablet Take 40 mg by mouth once daily.       biotin 5 mg Cap Take 1 capsule by mouth once daily.       diclofenac (FLECTOR) 1.3 % PT12 Diclofenac       diclofenac (VOLTAREN) 75 MG EC tablet Take 1 tablet (75 mg total) by mouth 2 (two) times daily as needed. 40 tablet 1     diclofenac (VOLTAREN) 75 MG EC tablet TAKE ONE TABLET BY MOUTH TWICE DAILY WITH FOOD (Patient taking differently: Take 75 mg by mouth once daily.) 60 tablet 2     ezetimibe (ZETIA) 10 mg tablet Take 10 mg by mouth once daily.       furosemide (LASIX) 20 MG tablet Take 1 tablet by mouth 2 (two) times daily as needed.       gabapentin (NEURONTIN) 300 MG capsule TAKE 1 CAPSULE BY MOUTH DAILY 60 capsule 5     hydroCHLOROthiazide (MICROZIDE) 12.5 mg capsule Take 12.5 mg by mouth daily as needed.       HYDROcodone-acetaminophen (NORCO) 5-325 mg per tablet Take 1 tablet by mouth every 24 hours as needed for Pain. Greater than 7 day supply medically necessary 50 tablet 0     HYDROcodone-acetaminophen (NORCO) 5-325 mg per tablet TAKE ONE TABLET BY MOUTH EVERY 24 HOURS AS NEEDED FOR PAIN 50 tablet 0     metoprolol tartrate (LOPRESSOR) 25 MG tablet Take 25 mg by mouth 2 (two) times daily.        mometasone 0.1% (ELOCON) 0.1 % cream Apply topically once daily. 90 g 1     mupirocin (BACTROBAN) 2 % ointment Apply topically 2 (two) times daily as needed. 30 g 1     ondansetron (ZOFRAN) 4 MG tablet Take 4 mg by mouth every 8 (eight) hours as needed. AS NEEDED FOR NAUSEA       pantoprazole (PROTONIX) 40 MG tablet Take 40 mg by mouth.       tiZANidine (ZANAFLEX) 4 MG tablet Take 4 mg by mouth every 6 (six) hours as  "needed.       vitamin D 1000 units Tab Take 5,000 Units by mouth once daily.                  Review of patient's allergies indicates:   Allergen Reactions    Adhesive Blisters     Cloth tape causes blistering    Adhesive tape-silicones Other (See Comments)     Cloth tape causes blistering    Codeine Nausea And Vomiting    Nitrofurantoin Other (See Comments)     Flu-like symptoms    Sulfa (sulfonamide antibiotics) Nausea Only    Sulfasalazine Nausea Only       Social History:   Social History     Occupational History    Not on file   Tobacco Use    Smoking status: Never    Smokeless tobacco: Never   Substance and Sexual Activity    Alcohol use: Yes     Alcohol/week: 1.0 standard drink of alcohol     Types: 1 Glasses of wine per week     Comment: social; less than monthly    Drug use: No    Sexual activity: Not Currently       Family History:   Family History   Problem Relation Name Age of Onset    Heart disease Mother      Cancer Mother          chest    Alzheimer's disease Father      Breast cancer Maternal Aunt         Review of Systems:  A 10 point review of systems was performed and was normal, except as mentioned in the HPI, including constitutional, HEENT, heme, lymph, cardiovascular, respiratory, gastrointestinal, genitourinary, neurologic, endocrine, psychiatric and musculoskeletal.      OBJECTIVE:    Physical Exam:  24 Hour Vital Sign Ranges: Temp:  [97.1 °F (36.2 °C)] 97.1 °F (36.2 °C)  Pulse:  [85] 85  Resp:  [16] 16  SpO2:  [100 %] 100 %  BP: (156)/(73) 156/73  Most recent vitals: BP (!) 156/73 (BP Location: Left arm, Patient Position: Lying)   Pulse 85   Temp 97.1 °F (36.2 °C) (Temporal)   Resp 16   Ht 5' 1" (1.549 m)   Wt 62.1 kg (137 lb)   SpO2 100%   BMI 25.89 kg/m²    GEN: well-developed, well-nourished, awake and alert, non-toxic appearing adult  HEENT: PERRL, sclera anicteric, oral mucosa pink and moist without lesion  NECK: trachea midline; Good ROM  CV: regular rate and rhythm, no murmurs " "or gallops  RESP: clear to auscultation bilaterally, no wheezes, rhonci or rales  ABD: soft, non-tender, non-distended, normal bowel sounds  EXT: no swelling or edema, 2+ pulses distally  SKIN: no rashes or jaundice  PSYCH: normal affect    Labs:   No results for input(s): "WBC", "MCV", "PLT" in the last 72 hours.    Invalid input(s): "HGBAU"  No results for input(s): "NA", "K", "CL", "CO2", "BUN", "GLU" in the last 72 hours.    Invalid input(s): "CREA"  No results for input(s): "ALB" in the last 72 hours.    Invalid input(s): "ALKP", "SGOT", "SGPT", "TBIL", "DBIL", "TPRO"  No results for input(s): "PT", "INR", "PTT" in the last 72 hours.      IMPRESSION / RECOMMENDATIONS:  Colonoscopy  with interventions as warranted.   RIsks, benefits, alternatives discussed in detail regarding upcoming procedures and sedation. Some of the more common endoscopic complications include but not limited to immediate or delayed perforation, bleeding, infections, pain, inadvertent injury to surrounding tissue / organs and possible need for surgical evaluation. Patient expressed understanding, all questions answered and will proceed with procedure as planned.     Rajinder Jacobo III  12/10/2024  10:03 AM      "

## 2024-12-10 NOTE — ANESTHESIA POSTPROCEDURE EVALUATION
Anesthesia Post Evaluation    Patient: Lis Perdue    Procedure(s) Performed: Procedure(s) (LRB):  COLONOSCOPY (N/A)    Final Anesthesia Type: general      Patient location during evaluation: GI PACU  Patient participation: Yes- Able to Participate  Level of consciousness: awake and alert and oriented  Post-procedure vital signs: reviewed and stable  Pain management: adequate  Airway patency: patent    PONV status at discharge: No PONV  Anesthetic complications: no      Cardiovascular status: blood pressure returned to baseline, hemodynamically stable and stable  Respiratory status: unassisted, spontaneous ventilation and room air  Hydration status: euvolemic  Follow-up not needed.              Vitals Value Taken Time   /74 12/10/24 1103   Temp 36.2 °C (97.1 °F) 12/10/24 1042   Pulse 81 12/10/24 1103   Resp 16 12/10/24 1120   SpO2 100 % 12/10/24 1103   Vitals shown include unfiled device data.      No case tracking events are documented in the log.      Pain/Sánchez Score: Sánchez Score: 10 (12/10/2024 10:43 AM)

## 2024-12-10 NOTE — ANESTHESIA PREPROCEDURE EVALUATION
12/10/2024  Lis Perdue is a 79 y.o., female.    Patient Active Problem List   Diagnosis    Primary osteoarthritis of left hip    Lumbar radiculopathy    Lumbar spondylosis       Past Surgical History:   Procedure Laterality Date     SECTION      CYSTOSCOPY N/A 2024    Procedure: CYSTOSCOPY;  Surgeon: Loraine Amaya MD;  Location: [facility];  Service: Urology;  Laterality: N/A;    EPIDURAL STEROID INJECTION INTO LUMBAR SPINE N/A 2019    Procedure: Injection-steroid-epidural-lumbar L5/S1;  Surgeon: Alonso Birmingham MD;  Location: [facility] OR;  Service: Pain Management;  Laterality: N/A;    INJECTION OF ANESTHETIC AGENT AROUND MEDIAL BRANCH NERVES INNERVATING LUMBAR FACET JOINT Bilateral 2019    Procedure: Block-nerve-medial branch-lumbar L3,4,5;  Surgeon: Alonso Birmingham MD;  Location: [facility] OR;  Service: Pain Management;  Laterality: Bilateral;    JOINT REPLACEMENT Left 2018    MILA    KNEE ARTHROSCOPY      RIGHT KNEE SCOPE    MASTECTOMY Left     RADIOFREQUENCY ABLATION OF LUMBAR MEDIAL BRANCH NERVE AT SINGLE LEVEL Bilateral 2019    Procedure: Radiofrequency Ablation, Nerve, Spinal, Lumbar, Medial Branch L3,4,5;  Surgeon: Alonso Birmingham MD;  Location: [facility] OR;  Service: Pain Management;  Laterality: Bilateral;    SHOULDER ARTHROSCOPY Right     TONSILLECTOMY          Tobacco Use:  The patient  reports that she has never smoked. She has never used smokeless tobacco.     Results for orders placed or performed during the hospital encounter of 24   EKG 12-lead    Collection Time: 24  2:52 PM   Result Value Ref Range    QRS Duration 86 ms    [facility] QTC Calculation 467 ms    Narrative    Test Reason : R53.83,    Vent. Rate : 092 BPM     Atrial Rate : 092 BPM     P-R Int : 136 ms          QRS Dur : 086 ms      QT Int : 378 ms       P-R-T Axes :  057 035 038 degrees     QTc Int : 467 ms    Normal sinus rhythm  Nonspecific ST abnormality  Abnormal ECG    Confirmed by Antoinette ESTEVEZ, Miguel Cortes ([id number]) on 7/8/2024 7:04:27 PM    Referred By: ARJUN   SELF           Confirmed By:Mgiuel Curry MD             Lab Results   Component Value Date    WBC 7.80 12/03/2024    HGB 10.5 (L) 12/03/2024    HCT 33.9 (L) 12/03/2024    MCV 94 12/03/2024     12/03/2024     BMP  Lab Results   Component Value Date     12/03/2024    K 4.5 12/03/2024     12/03/2024    CO2 26 12/03/2024    BUN 30 (H) 12/03/2024    CREATININE 1.2 12/03/2024    CALCIUM 9.1 12/03/2024    ANIONGAP 8 12/03/2024    GLU 94 12/03/2024     07/05/2024    GLU 92 07/02/2024       No results found for this or any previous visit.           Pre-op Assessment    I have reviewed the Patient Summary Reports.     I have reviewed the Nursing Notes. I have reviewed the NPO Status.   I have reviewed the Medications.     Review of Systems  Anesthesia Hx:  No problems with previous Anesthesia   Neg history of prior surgery.          Denies Family Hx of Anesthesia complications.    Denies Personal Hx of Anesthesia complications.                    Social:  Non-Smoker, Alcohol Use       Hematology/Oncology:  Hematology Normal                       --  Cancer in past history:       Breast              EENT/Dental:  EENT/Dental Normal           Cardiovascular:     Hypertension                                          Pulmonary:  Pulmonary Normal                       Renal/:  Renal/ Normal                 Hepatic/GI:     GERD                Musculoskeletal:  Arthritis (Left hip)               Neurological:    Neuromuscular Disease, (Sciatica)                                   Endocrine:  Endocrine Normal            Dermatological:  Skin Normal    Psych:  Psychiatric Normal                    Physical Exam  General: Well nourished and Alert    Airway:  Mallampati: II   Mouth Opening: Normal  TM  Distance: Normal  Tongue: Normal  Neck ROM: Normal ROM    Dental:  Intact    Chest/Lungs:  Clear to auscultation, Normal Respiratory Rate    Heart:  Rate: Normal  Rhythm: Regular Rhythm  Sounds: Normal        Anesthesia Plan  Type of Anesthesia, risks & benefits discussed:    Anesthesia Type: Gen Natural Airway  Intra-op Monitoring Plan: Standard ASA Monitors  Post Op Pain Control Plan:   (medical reason for not using multimodal pain management)  Induction:  IV  Informed Consent: Informed consent signed with the Patient and all parties understand the risks and agree with anesthesia plan.  All questions answered. Patient consented to blood products? Yes  ASA Score: 3    Ready For Surgery From Anesthesia Perspective.     .

## 2024-12-13 ENCOUNTER — TELEPHONE (OUTPATIENT)
Dept: SURGERY | Facility: CLINIC | Age: 79
End: 2024-12-13
Payer: MEDICARE

## 2024-12-13 NOTE — TELEPHONE ENCOUNTER
----- Message from Rosalio Wilson MD sent at 12/10/2024 11:00 AM CST -----  Sure.  Would be in [place] this week.  [place] next Tuesday  ----- Message -----  From: Rajinder Jacobo III, MD  Sent: 12/10/2024  10:48 AM CST  To: Rosalio Wilson MD    Two separate high grade strictures in ascending and hep flex. Bx taken. Suspect crohn's. Tatoo placed in proximal transverse. CT enterography pending. Can you get her in clinic?

## 2024-12-13 NOTE — TELEPHONE ENCOUNTER
----- Message from Rajinder Jacobo MD sent at 12/10/2024 11:18 AM CST -----  Next week is fine. Also has been taking a bunch of NSAIDs x 10 years but strictures pretty impressive for NSAIDs.  ----- Message -----  From: Rosalio Wilson MD  Sent: 12/10/2024  11:01 AM CST  To: Kota Blackburn LPN; #    Sure.  Would be in [place] this week.  [place] next Tuesday  ----- Message -----  From: Rajinder Jacobo III, MD  Sent: 12/10/2024  10:48 AM CST  To: Rosalio Wilson MD    Two separate high grade strictures in ascending and hep flex. Bx taken. Suspect crohn's. Tatoo placed in proximal transverse. CT enterography pending. Can you get her in clinic?

## 2024-12-18 ENCOUNTER — TELEPHONE (OUTPATIENT)
Dept: SURGERY | Facility: CLINIC | Age: 79
End: 2024-12-18
Payer: MEDICARE

## 2024-12-18 NOTE — TELEPHONE ENCOUNTER
----- Message from Rajinder Jacobo MD sent at 12/10/2024 11:18 AM CST -----  Next week is fine. Also has been taking a bunch of NSAIDs x 10 years but strictures pretty impressive for NSAIDs.  ----- Message -----  From: Rosalio Wilson MD  Sent: 12/10/2024  11:01 AM CST  To: Kota Blackburn LPN; #    Sure.  Would be in [place] this week.  [place] next Tuesday  ----- Message -----  From: Rajinder Jacobo III, MD  Sent: 12/10/2024  10:48 AM CST  To: Rosalio Wilson MD    Two separate high grade strictures in ascending and hep flex. Bx taken. Suspect crohn's. Tatoo placed in proximal transverse. CT enterography pending. Can you get her in clinic?

## 2025-01-02 ENCOUNTER — TELEPHONE (OUTPATIENT)
Dept: SURGERY | Facility: CLINIC | Age: 80
End: 2025-01-02
Payer: MEDICARE

## 2025-01-02 NOTE — TELEPHONE ENCOUNTER
----- Message from Rajinder Jacobo MD sent at 12/19/2024  5:40 PM CST -----  Regarding: RE: NATI  Thanks. I appreciate it. I'll try and reach out also.....  ----- Message -----  From: Kota Blackburn LPN  Sent: 12/19/2024   4:17 PM CST  To: Rajinder Jacobo III, MD  Subject: FYI                                              Hello Dr Jacobo, I've called and left messages twice will try again  ----- Message -----  From: Rajinder Jacobo III, MD  Sent: 12/10/2024  11:18 AM CST  To: Kota Blackburn LPN; Rosalio Wilson MD    Next week is fine. Also has been taking a bunch of NSAIDs x 10 years but strictures pretty impressive for NSAIDs.  ----- Message -----  From: Rosalio Wilson MD  Sent: 12/10/2024  11:01 AM CST  To: Kota Blackburn LPN; #    Sure.  Would be in [place] this week.  [place] next Tuesday  ----- Message -----  From: Rajinder Jacobo III, MD  Sent: 12/10/2024  10:48 AM CST  To: Rosalio Wilson MD    Two separate high grade strictures in ascending and hep flex. Bx taken. Suspect crohn's. Tatoo placed in proximal transverse. CT enterography pending. Can you get her in clinic?

## 2025-01-17 ENCOUNTER — HOSPITAL ENCOUNTER (OUTPATIENT)
Dept: RADIOLOGY | Facility: HOSPITAL | Age: 80
Discharge: HOME | End: 2025-01-17
Attending: INTERNAL MEDICINE
Payer: MEDICARE

## 2025-01-17 DIAGNOSIS — K56.699 COLON STRICTURE: ICD-10-CM

## 2025-01-17 PROCEDURE — 25500020 PHARM REV CODE 255: Performed by: INTERNAL MEDICINE

## 2025-01-17 PROCEDURE — 74177 CT ABD & PELVIS W/CONTRAST: CPT | Mod: TC

## 2025-01-17 PROCEDURE — 74177 CT ABD & PELVIS W/CONTRAST: CPT | Mod: 26,,, | Performed by: RADIOLOGY

## 2025-01-17 RX ADMIN — IOHEXOL 100 ML: 350 INJECTION, SOLUTION INTRAVENOUS at 03:01

## (undated) DEVICE — GLOVE SURGICAL LATEX SZ 7

## (undated) DEVICE — Device

## (undated) DEVICE — CANNULA CVD 100MM X 20G

## (undated) DEVICE — NDL 18GA X1 1/2 REG BEVEL

## (undated) DEVICE — SYR GLASS 5CC LUER LOK

## (undated) DEVICE — MARKER SKIN STND TIP BLUE BARR

## (undated) DEVICE — APPLICATOR CHLORAPREP CLR 10.5

## (undated) DEVICE — TRAY NERVE BLOCK

## (undated) DEVICE — GLOVE SENSICARE PI ALOE 6

## (undated) DEVICE — SEE MEDLINE ITEM 152622

## (undated) DEVICE — NDL TUOHY EPIDURAL 20G X 3.5

## (undated) DEVICE — KIT ENDOKIT EGD/COLON/ERCP

## (undated) DEVICE — SET CYSTO IRR DRP CHMBR 84IN

## (undated) DEVICE — NDL SPINAL SPINOCAN 22GX3.5

## (undated) DEVICE — SWABSTICK POVIDONE-IODINE PVP

## (undated) DEVICE — PAD ELECTROSURGICAL PAT PLATE